# Patient Record
Sex: FEMALE | Race: BLACK OR AFRICAN AMERICAN | Employment: OTHER | URBAN - METROPOLITAN AREA
[De-identification: names, ages, dates, MRNs, and addresses within clinical notes are randomized per-mention and may not be internally consistent; named-entity substitution may affect disease eponyms.]

---

## 2020-01-19 ENCOUNTER — HOSPITAL ENCOUNTER (OUTPATIENT)
Dept: MRI IMAGING | Age: 65
Discharge: HOME | End: 2020-01-19
Attending: ORTHOPAEDIC SURGERY
Payer: MEDICARE

## 2020-01-19 DIAGNOSIS — M75.102 TEAR OF LEFT ROTATOR CUFF: ICD-10-CM

## 2020-01-19 PROCEDURE — 73221 MRI JOINT UPR EXTREM W/O DYE: CPT

## 2020-01-22 ENCOUNTER — OFFICE VISIT (OUTPATIENT)
Dept: SURGERY | Age: 65
End: 2020-01-22

## 2020-01-22 VITALS
HEART RATE: 48 BPM | SYSTOLIC BLOOD PRESSURE: 172 MMHG | WEIGHT: 293 LBS | HEIGHT: 64 IN | BODY MASS INDEX: 50.02 KG/M2 | DIASTOLIC BLOOD PRESSURE: 52 MMHG

## 2020-01-22 DIAGNOSIS — S21.002A WOUND OF LEFT BREAST, INITIAL ENCOUNTER: Primary | ICD-10-CM

## 2020-01-22 PROBLEM — E66.01 OBESITY, MORBID (HCC): Status: ACTIVE | Noted: 2020-01-22

## 2020-01-22 RX ORDER — DOXYCYCLINE 100 MG/1
CAPSULE ORAL
Status: ON HOLD | COMMUNITY
Start: 2020-01-03 | End: 2021-08-09

## 2020-01-22 RX ORDER — NALTREXONE HYDROCHLORIDE 50 MG/1
TABLET, FILM COATED ORAL
COMMUNITY
Start: 2019-12-03

## 2020-01-22 RX ORDER — KETOCONAZOLE 20 MG/G
CREAM TOPICAL
Status: ON HOLD | COMMUNITY
Start: 2019-12-10 | End: 2022-04-13

## 2020-01-22 RX ORDER — MELOXICAM 15 MG/1
TABLET ORAL
Status: ON HOLD | COMMUNITY
Start: 2020-01-12 | End: 2022-04-13

## 2020-01-22 RX ORDER — POTASSIUM CHLORIDE 20 MEQ/1
TABLET, EXTENDED RELEASE ORAL
COMMUNITY

## 2020-01-22 RX ORDER — BENAZEPRIL/HYDROCHLOROTHIAZIDE 20 MG-25MG
TABLET ORAL
Status: ON HOLD | COMMUNITY
End: 2022-04-13

## 2020-01-22 RX ORDER — BUPROPION HYDROCHLORIDE 300 MG/1
TABLET ORAL
COMMUNITY
Start: 2019-11-09

## 2020-01-22 RX ORDER — METHOCARBAMOL 500 MG/1
TABLET, FILM COATED ORAL
COMMUNITY
Start: 2020-01-12

## 2020-01-22 RX ORDER — HYDROXYZINE 25 MG/1
TABLET, FILM COATED ORAL
COMMUNITY

## 2020-01-22 RX ORDER — IPRATROPIUM BROMIDE 21 UG/1
SPRAY, METERED NASAL
Status: ON HOLD | COMMUNITY
Start: 2019-12-30 | End: 2021-08-09

## 2020-01-22 RX ORDER — ASPIRIN 325 MG
TABLET ORAL
Status: ON HOLD | COMMUNITY
End: 2022-04-13

## 2020-01-22 RX ORDER — NAPROXEN SODIUM 220 MG
220 TABLET ORAL 2 TIMES DAILY WITH MEALS
COMMUNITY

## 2020-01-22 RX ORDER — ESTRADIOL 10 UG/1
10 INSERT VAGINAL DAILY
Status: ON HOLD | COMMUNITY
End: 2021-08-09

## 2020-01-22 RX ORDER — MUPIROCIN 20 MG/G
OINTMENT TOPICAL
COMMUNITY
Start: 2020-01-17 | End: 2022-04-13

## 2020-01-22 NOTE — LETTER
1/27/2020 10:01 AM 
 
Patient:  Sheeba Dai YOB: 1955 Date of Visit: 1/22/2020 Dear Dr. Olivarez Silence: Thank you for referring Ms. Sheeba Dai to me for evaluation/treatment. Below are the relevant portions of my assessment and plan of care. HISTORY OF PRESENT ILLNESS Sheeba Dai is a 77 y.o. female. HPI 
NEW patient presents for consultation at the request of Dr. Niko Morel for skin lesion times about four days on the LEFT breast laterally. Says that she noticed some itching and then looked and noticed a red area with a scab in the middle. This has changed somewhat over the last couple of days. She has no pain and no longer has any itching. Feels no palpable breast lumps, has no nipple discharge/retraction or skin change. She has never had a breast biopsy. FH - there is no FH of breast or ovarian cancer. A maternal aunt had lung cancer and a brother had prostate cancer. Last mammogram was 6/2019 at [place], BIRADS 1, negative.   
  
 
Past Medical History:  
Diagnosis Date  Arthritis  Asthma  Hypertension  Other ill-defined conditions(799.89)   
 fibromyalgia  Other ill-defined conditions(799.89) irregular heart beat  Other ill-defined conditions(799.89)   
 edema legs  Psychiatric disorder   
 depression  Psychiatric disorder   
 anxiety  PUD (peptic ulcer disease) Past Surgical History:  
Procedure Laterality Date  BREAST SURGERY PROCEDURE UNLISTED    
 sebaceous cyst removal  
 HX GYN    
 d&c  
 HX HEENT    
 keloids removed from ears  HX HEENT    
 tonsillectomy  HX ORTHOPAEDIC    
 right ankle Social History Socioeconomic History  Marital status:  Spouse name: Not on file  Number of children: Not on file  Years of education: Not on file  Highest education level: Not on file Occupational History  Not on file Social Needs  Financial resource strain: Not on file  Food insecurity:  
  Worry: Not on file Inability: Not on file  Transportation needs:  
  Medical: Not on file Non-medical: Not on file Tobacco Use  Smoking status: Never Smoker  Smokeless tobacco: Never Used Substance and Sexual Activity  Alcohol use: No  
 Drug use: Not on file  Sexual activity: Not on file Lifestyle  Physical activity:  
  Days per week: Not on file Minutes per session: Not on file  Stress: Not on file Relationships  Social connections:  
  Talks on phone: Not on file Gets together: Not on file Attends [employer] service: Not on file Active member of club or organization: Not on file Attends meetings of clubs or organizations: Not on file Relationship status: Not on file  Intimate partner violence:  
  Fear of current or ex partner: Not on file Emotionally abused: Not on file Physically abused: Not on file Forced sexual activity: Not on file Other Topics Concern  Not on file Social History Narrative  Not on file Current Outpatient Medications on File Prior to Visit Medication Sig Dispense Refill  buPROPion XL (WELLBUTRIN XL) 300 mg XL tablet  benazepril-hydroCHLOROthiazide (LOTENSIN HCT) 20-25 mg per tablet 1 tab(s)  doxycycline (VIBRAMYCIN) 100 mg capsule  hydroxyzine HCL (ATARAX) 25 mg tablet 1-4 tabs  ipratropium (ATROVENT) 0.03 % nasal spray  ketoconazole (NIZORAL) 2 % topical cream     
 meloxicam (MOBIC) 15 mg tablet  methocarbamol (ROBAXIN) 500 mg tablet TAKE 2 TABLET BY MOUTH 3 TIMES A DAY AS NEEDED    
 mupirocin (BACTROBAN) 2 % ointment  naltrexone (DEPADE) 50 mg tablet TAKE 1 TABLET BY MOUTH EVERY DAY  potassium chloride (K-DUR, KLOR-CON) 20 mEq tablet 1 tab(s)  aspirin (ASPIRIN) 325 mg tablet 1 tab(s)  estradioL (YUVAFEM) 10 mcg tab vaginal tablet Insert 10 mcg into vagina daily.  naproxen sodium (ALEVE) 220 mg tablet Take 220 mg by mouth two (2) times daily (with meals).  epinephrine (EPIPEN INJECTION) by Injection route.  OTHER Hylands leg cramps OTC  esomeprazole (NEXIUM) 20 mg capsule Take 40 mg by mouth two (2) times a day.  carvedilol (COREG) 12.5 mg tablet Take 25 mg by mouth two (2) times daily (with meals).  [DISCONTINUED] cyclobenzaprine (FLEXERIL) 10 mg tablet Take 1 Tab by mouth three (3) times daily as needed for Muscle Spasm(s). 20 Tab 0  [DISCONTINUED] nabumetone (RELAFEN) 500 mg tablet Take 500 mg by mouth two (2) times a day.  [DISCONTINUED] aspirin 81 mg chewable tablet Take 81 mg by mouth daily.  [DISCONTINUED] clonazePAM (KLONOPIN) 0.5 mg tablet Take  by mouth nightly as needed.  [DISCONTINUED] lamoTRIgine (LAMICTAL) 25 mg tablet Take 100 mg by mouth daily.  [DISCONTINUED] olmesartan-hydrochlorothiazide (BENICAR HCT) 20-12.5 mg per tablet Take 1 Tab by mouth daily.  [DISCONTINUED] hydrOXYzine (VISTARIL) 100 mg capsule Take 100 mg by mouth three (3) times daily as needed for Itching.  [DISCONTINUED] atorvastatin (LIPITOR) 20 mg tablet Take  by mouth daily.  [DISCONTINUED] NEPAFENAC (NEVANAC OP) Apply  to eye two (2) times a day. Both eyes  [DISCONTINUED] traMADol (ULTRAM) 50 mg tablet Take 50 mg by mouth every six (6) hours as needed for Pain. No current facility-administered medications on file prior to visit. Allergies Allergen Reactions  Augmentin [Amoxicillin-Pot Clavulanate] Anaphylaxis  Sulfa (Sulfonamide Antibiotics) Anaphylaxis  Venom-Honey Bee Anaphylaxis  Avelox [Moxifloxacin] Unknown (comments)  Blueberry Rash  Celebrex [Celecoxib] Rash  Codeine Rash  Ibuprofen Rash  Lodine [Etodolac] Unknown (comments)  Nsaids (Non-Steroidal Anti-Inflammatory Drug) Rash Some nsaids are tolerated  Shellfish Derived Hives  Soma [Carisoprodol] Unknown (comments)  Tequin [Gatifloxacin] Unknown (comments) OB History No obstetric history on file. Obstetric Comments Menarche 13, LMP 10-15 years ago, # of children 1, age of 4st delivery 32, Hysterectomy/oophorectomy NoNo, Breast bx No, history of breast feeding Yes, BCP Yes, in the past, Hormone therapy No 
  
  
  
 
 
ROS Constitutional: Positive for weight loss. HENT: Positive for congestion. Eyes: Positive for pain. Respiratory: Negative. Cardiovascular: Positive for chest pain, palpitations and leg swelling. Gastrointestinal: Positive for abdominal pain, constipation and heartburn. Genitourinary: Negative. Musculoskeletal: Positive for joint pain and myalgias. Skin: Positive for itching and rash. Neurological: Positive for dizziness. Endo/Heme/Allergies: Negative. Psychiatric/Behavioral: Positive for depression. The patient is nervous/anxious. Physical Exam 
Exam conducted with a chaperone present. Cardiovascular:  
   Rate and Rhythm: Normal rate and regular rhythm. Heart sounds: Normal heart sounds. Pulmonary:  
   Breath sounds: Normal breath sounds. Chest:  
   Breasts: Breasts are symmetrical.  
      Right: Normal. No swelling, bleeding, inverted nipple, mass, nipple discharge, skin change or tenderness. Left: Normal. No swelling, bleeding, inverted nipple, mass, nipple discharge, skin change or tenderness. Lymphadenopathy:  
   Cervical:  
   Right cervical: No superficial, deep or posterior cervical adenopathy. Left cervical: No superficial, deep or posterior cervical adenopathy. Upper Body:  
   Right upper body: No supraclavicular or axillary adenopathy. Left upper body: No supraclavicular or axillary adenopathy. ASSESSMENT and PLAN 
  ICD-10-CM ICD-9-CM 1. Lesion of skin of breast N64.9 611.9 2. Wound of left breast, initial encounter S21.002A 879.0 New patient presents for evaluation of LEFT breast skin lesion, and is doing well overall. Healing, superficial granulating wound noted at lateral LEFT breast. Probable spider or insect bite. Pt may continue to apply topical antibiotic. If not completely healed in 1 month, pt to follow up for bx. F/U PRN. This plan was reviewed with the patient and patient agrees. All questions were answered. Written by Roz Pagan, as dictated by Dr. Jason Pressley MD. 
 
 
If you have questions, please do not hesitate to call me. I look forward to following Ms. Kelsey Rose along with you.  
 
 
 
Sincerely, 
 
 
Shawanda Mane MD

## 2020-01-22 NOTE — PROGRESS NOTES
HISTORY OF PRESENT ILLNESS  Geneva Moyer is a 77 y.o. female. HPI  NEW patient presents for consultation at the request of Dr. Ashlee Olea for skin lesion times about four days on the LEFT breast laterally. Says that she noticed some itching and then looked and noticed a red area with a scab in the middle. This has changed somewhat over the last couple of days. She has no pain and no longer has any itching. Feels no palpable breast lumps, has no nipple discharge/retraction or skin change. She has never had a breast biopsy. FH - there is no FH of breast or ovarian cancer. A maternal aunt had lung cancer and a brother had prostate cancer.      Last mammogram was 6/2019 at [facility], BIRADS 1, negative.         Past Medical History:   Diagnosis Date    Arthritis     Asthma     Hypertension     Other ill-defined conditions(799.89)     fibromyalgia    Other ill-defined conditions(799.89)     irregular heart beat    Other ill-defined conditions(799.89)     edema legs    Psychiatric disorder     depression    Psychiatric disorder     anxiety    PUD (peptic ulcer disease)        Past Surgical History:   Procedure Laterality Date    BREAST SURGERY PROCEDURE UNLISTED      sebaceous cyst removal    HX GYN      d&c    HX HEENT      keloids removed from ears    HX HEENT      tonsillectomy    HX ORTHOPAEDIC      right ankle       Social History     Socioeconomic History    Marital status:      Spouse name: Not on file    Number of children: Not on file    Years of education: Not on file    Highest education level: Not on file   Occupational History    Not on file   Social Needs    Financial resource strain: Not on file    Food insecurity:     Worry: Not on file     Inability: Not on file    Transportation needs:     Medical: Not on file     Non-medical: Not on file   Tobacco Use    Smoking status: Never Smoker    Smokeless tobacco: Never Used   Substance and Sexual Activity    Alcohol use: No    Drug use: Not on file    Sexual activity: Not on file   Lifestyle    Physical activity:     Days per week: Not on file     Minutes per session: Not on file    Stress: Not on file   Relationships    Social connections:     Talks on phone: Not on file     Gets together: Not on file     Attends [employer] service: Not on file     Active member of club or organization: Not on file     Attends meetings of clubs or organizations: Not on file     Relationship status: Not on file    Intimate partner violence:     Fear of current or ex partner: Not on file     Emotionally abused: Not on file     Physically abused: Not on file     Forced sexual activity: Not on file   Other Topics Concern    Not on file   Social History Narrative    Not on file       Current Outpatient Medications on File Prior to Visit   Medication Sig Dispense Refill    buPROPion XL (WELLBUTRIN XL) 300 mg XL tablet       benazepril-hydroCHLOROthiazide (LOTENSIN HCT) 20-25 mg per tablet 1 tab(s)      doxycycline (VIBRAMYCIN) 100 mg capsule       hydroxyzine HCL (ATARAX) 25 mg tablet 1-4 tabs      ipratropium (ATROVENT) 0.03 % nasal spray       ketoconazole (NIZORAL) 2 % topical cream       meloxicam (MOBIC) 15 mg tablet       methocarbamol (ROBAXIN) 500 mg tablet TAKE 2 TABLET BY MOUTH 3 TIMES A DAY AS NEEDED      mupirocin (BACTROBAN) 2 % ointment       naltrexone (DEPADE) 50 mg tablet TAKE 1 TABLET BY MOUTH EVERY DAY      potassium chloride (K-DUR, KLOR-CON) 20 mEq tablet 1 tab(s)      aspirin (ASPIRIN) 325 mg tablet 1 tab(s)      estradioL (YUVAFEM) 10 mcg tab vaginal tablet Insert 10 mcg into vagina daily.  naproxen sodium (ALEVE) 220 mg tablet Take 220 mg by mouth two (2) times daily (with meals).  epinephrine (EPIPEN INJECTION) by Injection route.  OTHER Hylands leg cramps OTC      esomeprazole (NEXIUM) 20 mg capsule Take 40 mg by mouth two (2) times a day.       carvedilol (COREG) 12.5 mg tablet Take 25 mg by mouth two (2) times daily (with meals).  [DISCONTINUED] cyclobenzaprine (FLEXERIL) 10 mg tablet Take 1 Tab by mouth three (3) times daily as needed for Muscle Spasm(s). 20 Tab 0    [DISCONTINUED] nabumetone (RELAFEN) 500 mg tablet Take 500 mg by mouth two (2) times a day.  [DISCONTINUED] aspirin 81 mg chewable tablet Take 81 mg by mouth daily.  [DISCONTINUED] clonazePAM (KLONOPIN) 0.5 mg tablet Take  by mouth nightly as needed.  [DISCONTINUED] lamoTRIgine (LAMICTAL) 25 mg tablet Take 100 mg by mouth daily.  [DISCONTINUED] olmesartan-hydrochlorothiazide (BENICAR HCT) 20-12.5 mg per tablet Take 1 Tab by mouth daily.  [DISCONTINUED] hydrOXYzine (VISTARIL) 100 mg capsule Take 100 mg by mouth three (3) times daily as needed for Itching.  [DISCONTINUED] atorvastatin (LIPITOR) 20 mg tablet Take  by mouth daily.  [DISCONTINUED] NEPAFENAC (NEVANAC OP) Apply  to eye two (2) times a day. Both eyes      [DISCONTINUED] traMADol (ULTRAM) 50 mg tablet Take 50 mg by mouth every six (6) hours as needed for Pain. No current facility-administered medications on file prior to visit. Allergies   Allergen Reactions    Augmentin [Amoxicillin-Pot Clavulanate] Anaphylaxis    Sulfa (Sulfonamide Antibiotics) Anaphylaxis    Venom-Honey Bee Anaphylaxis    Avelox [Moxifloxacin] Unknown (comments)    Blueberry Rash    Celebrex [Celecoxib] Rash    Codeine Rash    Ibuprofen Rash    Lodine [Etodolac] Unknown (comments)    Nsaids (Non-Steroidal Anti-Inflammatory Drug) Rash     Some nsaids are tolerated    Shellfish Derived Hives    Soma [Carisoprodol] Unknown (comments)    Tequin [Gatifloxacin] Unknown (comments)       OB History    No obstetric history on file.       Obstetric Comments   Menarche 13, LMP 10-15 years ago, # of children 1, age of 2st delivery 32, Hysterectomy/oophorectomy NoNo, Breast bx No, history of breast feeding Yes, BCP Yes, in the past, Hormone therapy No               ROS  Constitutional: Positive for weight loss. HENT: Positive for congestion. Eyes: Positive for pain. Respiratory: Negative. Cardiovascular: Positive for chest pain, palpitations and leg swelling. Gastrointestinal: Positive for abdominal pain, constipation and heartburn. Genitourinary: Negative. Musculoskeletal: Positive for joint pain and myalgias. Skin: Positive for itching and rash. Neurological: Positive for dizziness. Endo/Heme/Allergies: Negative. Psychiatric/Behavioral: Positive for depression. The patient is nervous/anxious. Physical Exam  Exam conducted with a chaperone present. Cardiovascular:      Rate and Rhythm: Normal rate and regular rhythm. Heart sounds: Normal heart sounds. Pulmonary:      Breath sounds: Normal breath sounds. Chest:      Breasts: Breasts are symmetrical.         Right: Normal. No swelling, bleeding, inverted nipple, mass, nipple discharge, skin change or tenderness. Left: Normal. No swelling, bleeding, inverted nipple, mass, nipple discharge, skin change or tenderness. Lymphadenopathy:      Cervical:      Right cervical: No superficial, deep or posterior cervical adenopathy. Left cervical: No superficial, deep or posterior cervical adenopathy. Upper Body:      Right upper body: No supraclavicular or axillary adenopathy. Left upper body: No supraclavicular or axillary adenopathy. ASSESSMENT and PLAN    ICD-10-CM ICD-9-CM    1. Lesion of skin of breast N64.9 611.9    2. Wound of left breast, initial encounter S21.002A 879.0       New patient presents for evaluation of LEFT breast skin lesion, and is doing well overall. Healing, superficial granulating wound noted at lateral LEFT breast. Probable spider or insect bite. Pt may continue to apply topical antibiotic. If not completely healed in 1 month, pt to follow up for bx. F/U PRN.  This plan was reviewed with the patient and patient agrees. All questions were answered.     Written by Maury Mills, as dictated by Dr. Perez Caldwell MD.

## 2020-01-22 NOTE — PROGRESS NOTES
HISTORY OF PRESENT ILLNESS Guerline Epps is a 77 y.o. female. HPI    NEW patient presents for consultation at the request of Dr. Dwaine Naylor for skin lesion times about four days on the LEFT breast laterally. Says that she noticed some itching and then looked and noticed a red area with a scab in the middle. This has changed somewhat over the last couple of days. She has no pain and no longer has any itching. Feels no palpable breast lumps, has no nipple discharge/retraction or skin change. She has never had a breast biopsy. FH - there is no FH of breast or ovarian cancer. A maternal aunt had lung cancer and a brother had prostate cancer. Last mammogram was 6/2019 at [facility], BIRADS 1, negative. Review of Systems Constitutional: Positive for weight loss. HENT: Positive for congestion. Eyes: Positive for pain. Respiratory: Negative. Cardiovascular: Positive for chest pain, palpitations and leg swelling. Gastrointestinal: Positive for abdominal pain, constipation and heartburn. Genitourinary: Negative. Musculoskeletal: Positive for joint pain and myalgias. Skin: Positive for itching and rash. Neurological: Positive for dizziness. Endo/Heme/Allergies: Negative. Psychiatric/Behavioral: Positive for depression. The patient is nervous/anxious. Physical Exam 
 
ASSESSMENT and PLAN 
{ASSESSMENT/PLAN:10212}

## 2020-01-27 NOTE — COMMUNICATION BODY
HISTORY OF PRESENT ILLNESS Courtney Novak is a 77 y.o. female. HPI 
NEW patient presents for consultation at the request of Dr. Molina Brothpavan for skin lesion times about four days on the LEFT breast laterally. Says that she noticed some itching and then looked and noticed a red area with a scab in the middle. This has changed somewhat over the last couple of days. She has no pain and no longer has any itching. Feels no palpable breast lumps, has no nipple discharge/retraction or skin change. She has never had a breast biopsy. FH - there is no FH of breast or ovarian cancer. A maternal aunt had lung cancer and a brother had prostate cancer. Last mammogram was 6/2019 at [place], [facility], negative.   
  
 
Past Medical History:  
Diagnosis Date  Arthritis  Asthma  Hypertension  Other ill-defined conditions(799.89)   
 fibromyalgia  Other ill-defined conditions(799.89) irregular heart beat  Other ill-defined conditions(799.89)   
 edema legs  Psychiatric disorder   
 depression  Psychiatric disorder   
 anxiety  PUD (peptic ulcer disease) Past Surgical History:  
Procedure Laterality Date  BREAST SURGERY PROCEDURE UNLISTED    
 sebaceous cyst removal  
 HX GYN    
 d&c  
 HX HEENT    
 keloids removed from ears  HX HEENT    
 tonsillectomy  HX ORTHOPAEDIC    
 right ankle Social History Socioeconomic History  Marital status:  Spouse name: Not on file  Number of children: Not on file  Years of education: Not on file  Highest education level: Not on file Occupational History  Not on file Social Needs  Financial resource strain: Not on file  Food insecurity:  
  Worry: Not on file Inability: Not on file  Transportation needs:  
  Medical: Not on file Non-medical: Not on file Tobacco Use  Smoking status: Never Smoker  Smokeless tobacco: Never Used Substance and Sexual Activity  Alcohol use: No  
 Drug use: Not on file  Sexual activity: Not on file Lifestyle  Physical activity:  
  Days per week: Not on file Minutes per session: Not on file  Stress: Not on file Relationships  Social connections:  
  Talks on phone: Not on file Gets together: Not on file Attends [employer] service: Not on file Active member of club or organization: Not on file Attends meetings of clubs or organizations: Not on file Relationship status: Not on file  Intimate partner violence:  
  Fear of current or ex partner: Not on file Emotionally abused: Not on file Physically abused: Not on file Forced sexual activity: Not on file Other Topics Concern  Not on file Social History Narrative  Not on file Current Outpatient Medications on File Prior to Visit Medication Sig Dispense Refill  buPROPion XL (WELLBUTRIN XL) 300 mg XL tablet  benazepril-hydroCHLOROthiazide (LOTENSIN HCT) 20-25 mg per tablet 1 tab(s)  doxycycline (VIBRAMYCIN) 100 mg capsule  hydroxyzine HCL (ATARAX) 25 mg tablet 1-4 tabs  ipratropium (ATROVENT) 0.03 % nasal spray  ketoconazole (NIZORAL) 2 % topical cream     
 meloxicam (MOBIC) 15 mg tablet  methocarbamol (ROBAXIN) 500 mg tablet TAKE 2 TABLET BY MOUTH 3 TIMES A DAY AS NEEDED    
 mupirocin (BACTROBAN) 2 % ointment  naltrexone (DEPADE) 50 mg tablet TAKE 1 TABLET BY MOUTH EVERY DAY  potassium chloride (K-DUR, KLOR-CON) 20 mEq tablet 1 tab(s)  aspirin (ASPIRIN) 325 mg tablet 1 tab(s)  estradioL (YUVAFEM) 10 mcg tab vaginal tablet Insert 10 mcg into vagina daily.  naproxen sodium (ALEVE) 220 mg tablet Take 220 mg by mouth two (2) times daily (with meals).  epinephrine (EPIPEN INJECTION) by Injection route.  OTHER Hylands leg cramps OTC  esomeprazole (NEXIUM) 20 mg capsule Take 40 mg by mouth two (2) times a day.  carvedilol (COREG) 12.5 mg tablet Take 25 mg by mouth two (2) times daily (with meals).  [DISCONTINUED] cyclobenzaprine (FLEXERIL) 10 mg tablet Take 1 Tab by mouth three (3) times daily as needed for Muscle Spasm(s). 20 Tab 0  [DISCONTINUED] nabumetone (RELAFEN) 500 mg tablet Take 500 mg by mouth two (2) times a day.  [DISCONTINUED] aspirin 81 mg chewable tablet Take 81 mg by mouth daily.  [DISCONTINUED] clonazePAM (KLONOPIN) 0.5 mg tablet Take  by mouth nightly as needed.  [DISCONTINUED] lamoTRIgine (LAMICTAL) 25 mg tablet Take 100 mg by mouth daily.  [DISCONTINUED] olmesartan-hydrochlorothiazide (BENICAR HCT) 20-12.5 mg per tablet Take 1 Tab by mouth daily.  [DISCONTINUED] hydrOXYzine (VISTARIL) 100 mg capsule Take 100 mg by mouth three (3) times daily as needed for Itching.  [DISCONTINUED] atorvastatin (LIPITOR) 20 mg tablet Take  by mouth daily.  [DISCONTINUED] NEPAFENAC (NEVANAC OP) Apply  to eye two (2) times a day. Both eyes  [DISCONTINUED] traMADol (ULTRAM) 50 mg tablet Take 50 mg by mouth every six (6) hours as needed for Pain. No current facility-administered medications on file prior to visit. Allergies Allergen Reactions  Augmentin [Amoxicillin-Pot Clavulanate] Anaphylaxis  Sulfa (Sulfonamide Antibiotics) Anaphylaxis  Venom-Honey Bee Anaphylaxis  Avelox [Moxifloxacin] Unknown (comments)  Blueberry Rash  Celebrex [Celecoxib] Rash  Codeine Rash  Ibuprofen Rash  Lodine [Etodolac] Unknown (comments)  Nsaids (Non-Steroidal Anti-Inflammatory Drug) Rash Some nsaids are tolerated  Shellfish Derived Hives  Soma [Carisoprodol] Unknown (comments)  Tequin [Gatifloxacin] Unknown (comments) OB History No obstetric history on file. Obstetric Comments Menarche 13, LMP 10-15 years ago, # of children 1, age of 4st delivery 32, Hysterectomy/oophorectomy NoNo, Breast bx No, history of breast feeding Yes, BCP Yes, in the past, Hormone therapy No 
  
  
  
 
 
ROS Constitutional: Positive for weight loss. HENT: Positive for congestion. Eyes: Positive for pain. Respiratory: Negative. Cardiovascular: Positive for chest pain, palpitations and leg swelling. Gastrointestinal: Positive for abdominal pain, constipation and heartburn. Genitourinary: Negative. Musculoskeletal: Positive for joint pain and myalgias. Skin: Positive for itching and rash. Neurological: Positive for dizziness. Endo/Heme/Allergies: Negative. Psychiatric/Behavioral: Positive for depression. The patient is nervous/anxious. Physical Exam 
Exam conducted with a chaperone present. Cardiovascular:  
   Rate and Rhythm: Normal rate and regular rhythm. Heart sounds: Normal heart sounds. Pulmonary:  
   Breath sounds: Normal breath sounds. Chest:  
   Breasts: Breasts are symmetrical.  
      Right: Normal. No swelling, bleeding, inverted nipple, mass, nipple discharge, skin change or tenderness. Left: Normal. No swelling, bleeding, inverted nipple, mass, nipple discharge, skin change or tenderness. Lymphadenopathy:  
   Cervical:  
   Right cervical: No superficial, deep or posterior cervical adenopathy. Left cervical: No superficial, deep or posterior cervical adenopathy. Upper Body:  
   Right upper body: No supraclavicular or axillary adenopathy. Left upper body: No supraclavicular or axillary adenopathy. ASSESSMENT and PLAN 
  ICD-10-CM ICD-9-CM 1. Lesion of skin of breast N64.9 611.9 2. Wound of left breast, initial encounter S21.002A 879.0 New patient presents for evaluation of LEFT breast skin lesion, and is doing well overall. Healing, superficial granulating wound noted at lateral LEFT breast. Probable spider or insect bite. Pt may continue to apply topical antibiotic.  If not completely healed in 1 month, pt to follow up for bx. F/U PRN. This plan was reviewed with the patient and patient agrees. All questions were answered.  
 
Written by Mark Camacho, as dictated by Dr. Seamus Soares MD.

## 2020-02-06 ENCOUNTER — DOCUMENTATION ONLY (OUTPATIENT)
Dept: SURGERY | Age: 65
End: 2020-02-06

## 2020-02-06 NOTE — PROGRESS NOTES
Progress note was faxed to Dr. [email or web address] and also mailed to patient per patient.

## 2021-08-09 ENCOUNTER — ANESTHESIA EVENT (OUTPATIENT)
Dept: ENDOSCOPY | Age: 66
End: 2021-08-09
Payer: MEDICARE

## 2021-08-09 ENCOUNTER — ANESTHESIA (OUTPATIENT)
Dept: ENDOSCOPY | Age: 66
End: 2021-08-09
Payer: MEDICARE

## 2021-08-09 ENCOUNTER — HOSPITAL ENCOUNTER (OUTPATIENT)
Age: 66
Setting detail: OUTPATIENT SURGERY
Discharge: HOME | End: 2021-08-09
Attending: INTERNAL MEDICINE | Admitting: INTERNAL MEDICINE
Payer: MEDICARE

## 2021-08-09 VITALS
RESPIRATION RATE: 12 BRPM | BODY MASS INDEX: 50.02 KG/M2 | TEMPERATURE: 96.9 F | SYSTOLIC BLOOD PRESSURE: 149 MMHG | DIASTOLIC BLOOD PRESSURE: 56 MMHG | OXYGEN SATURATION: 99 % | HEIGHT: 64 IN | HEART RATE: 61 BPM | WEIGHT: 293 LBS

## 2021-08-09 PROCEDURE — 2709999900 HC NON-CHARGEABLE SUPPLY: Performed by: INTERNAL MEDICINE

## 2021-08-09 PROCEDURE — 76040000019: Performed by: INTERNAL MEDICINE

## 2021-08-09 PROCEDURE — 74011000250 HC RX REV CODE- 250: Performed by: NURSE ANESTHETIST, CERTIFIED REGISTERED

## 2021-08-09 PROCEDURE — 74011250636 HC RX REV CODE- 250/636: Performed by: NURSE ANESTHETIST, CERTIFIED REGISTERED

## 2021-08-09 PROCEDURE — 76060000031 HC ANESTHESIA FIRST 0.5 HR: Performed by: INTERNAL MEDICINE

## 2021-08-09 RX ORDER — FLUMAZENIL 0.1 MG/ML
0.2 INJECTION INTRAVENOUS
Status: DISCONTINUED | OUTPATIENT
Start: 2021-08-09 | End: 2021-08-09 | Stop reason: HOSPADM

## 2021-08-09 RX ORDER — SODIUM CHLORIDE 0.9 % (FLUSH) 0.9 %
5-40 SYRINGE (ML) INJECTION EVERY 8 HOURS
Status: DISCONTINUED | OUTPATIENT
Start: 2021-08-09 | End: 2021-08-09 | Stop reason: HOSPADM

## 2021-08-09 RX ORDER — AMLODIPINE BESYLATE 10 MG/1
TABLET ORAL DAILY
Status: ON HOLD | COMMUNITY
End: 2022-04-13

## 2021-08-09 RX ORDER — METRONIDAZOLE 500 MG/1
500 TABLET ORAL 2 TIMES DAILY
COMMUNITY

## 2021-08-09 RX ORDER — DEXTROMETHORPHAN/PSEUDOEPHED 2.5-7.5/.8
1.2 DROPS ORAL
Status: DISCONTINUED | OUTPATIENT
Start: 2021-08-09 | End: 2021-08-09 | Stop reason: HOSPADM

## 2021-08-09 RX ORDER — NALOXONE HYDROCHLORIDE 0.4 MG/ML
0.4 INJECTION, SOLUTION INTRAMUSCULAR; INTRAVENOUS; SUBCUTANEOUS
Status: DISCONTINUED | OUTPATIENT
Start: 2021-08-09 | End: 2021-08-09 | Stop reason: HOSPADM

## 2021-08-09 RX ORDER — MIDAZOLAM HYDROCHLORIDE 1 MG/ML
.25-5 INJECTION, SOLUTION INTRAMUSCULAR; INTRAVENOUS
Status: DISCONTINUED | OUTPATIENT
Start: 2021-08-09 | End: 2021-08-09 | Stop reason: HOSPADM

## 2021-08-09 RX ORDER — ATROPINE SULFATE 0.1 MG/ML
0.5 INJECTION INTRAVENOUS
Status: DISCONTINUED | OUTPATIENT
Start: 2021-08-09 | End: 2021-08-09 | Stop reason: HOSPADM

## 2021-08-09 RX ORDER — CARBOXYMETHYLCELLULOSE SODIUM 10 MG/ML
1 GEL OPHTHALMIC AS NEEDED
COMMUNITY
End: 2022-04-13

## 2021-08-09 RX ORDER — SODIUM CHLORIDE 9 MG/ML
50 INJECTION, SOLUTION INTRAVENOUS CONTINUOUS
Status: DISCONTINUED | OUTPATIENT
Start: 2021-08-09 | End: 2021-08-09 | Stop reason: HOSPADM

## 2021-08-09 RX ORDER — LIDOCAINE HYDROCHLORIDE 20 MG/ML
INJECTION, SOLUTION EPIDURAL; INFILTRATION; INTRACAUDAL; PERINEURAL AS NEEDED
Status: DISCONTINUED | OUTPATIENT
Start: 2021-08-09 | End: 2021-08-09 | Stop reason: HOSPADM

## 2021-08-09 RX ORDER — FENTANYL CITRATE 50 UG/ML
25-200 INJECTION, SOLUTION INTRAMUSCULAR; INTRAVENOUS
Status: DISCONTINUED | OUTPATIENT
Start: 2021-08-09 | End: 2021-08-09 | Stop reason: HOSPADM

## 2021-08-09 RX ORDER — FLUTICASONE PROPIONATE AND SALMETEROL 100; 50 UG/1; UG/1
1 POWDER RESPIRATORY (INHALATION) EVERY 12 HOURS
COMMUNITY

## 2021-08-09 RX ORDER — EPINEPHRINE 0.1 MG/ML
1 INJECTION INTRACARDIAC; INTRAVENOUS
Status: DISCONTINUED | OUTPATIENT
Start: 2021-08-09 | End: 2021-08-09 | Stop reason: HOSPADM

## 2021-08-09 RX ORDER — ASCORBIC ACID 250 MG
TABLET ORAL DAILY
COMMUNITY

## 2021-08-09 RX ORDER — GLUCOSAMINE SULFATE 1500 MG
POWDER IN PACKET (EA) ORAL DAILY
COMMUNITY

## 2021-08-09 RX ORDER — OMEGA-3-ACID ETHYL ESTERS 1 G/1
CAPSULE, LIQUID FILLED ORAL
COMMUNITY

## 2021-08-09 RX ORDER — SODIUM CHLORIDE 9 MG/ML
INJECTION, SOLUTION INTRAVENOUS
Status: DISCONTINUED | OUTPATIENT
Start: 2021-08-09 | End: 2021-08-09 | Stop reason: HOSPADM

## 2021-08-09 RX ORDER — OLOPATADINE HYDROCHLORIDE 2 MG/ML
1 SOLUTION/ DROPS OPHTHALMIC DAILY
COMMUNITY

## 2021-08-09 RX ORDER — SPIRONOLACTONE 25 MG/1
TABLET ORAL DAILY
COMMUNITY

## 2021-08-09 RX ORDER — SODIUM CHLORIDE 0.9 % (FLUSH) 0.9 %
5-40 SYRINGE (ML) INJECTION AS NEEDED
Status: DISCONTINUED | OUTPATIENT
Start: 2021-08-09 | End: 2021-08-09 | Stop reason: HOSPADM

## 2021-08-09 RX ORDER — PROPOFOL 10 MG/ML
INJECTION, EMULSION INTRAVENOUS AS NEEDED
Status: DISCONTINUED | OUTPATIENT
Start: 2021-08-09 | End: 2021-08-09 | Stop reason: HOSPADM

## 2021-08-09 RX ADMIN — PROPOFOL 100 MG: 10 INJECTION, EMULSION INTRAVENOUS at 11:32

## 2021-08-09 RX ADMIN — PROPOFOL 30 MG: 10 INJECTION, EMULSION INTRAVENOUS at 11:38

## 2021-08-09 RX ADMIN — PROPOFOL 30 MG: 10 INJECTION, EMULSION INTRAVENOUS at 11:35

## 2021-08-09 RX ADMIN — SODIUM CHLORIDE: 900 INJECTION, SOLUTION INTRAVENOUS at 11:41

## 2021-08-09 RX ADMIN — LIDOCAINE HYDROCHLORIDE 100 MG: 20 INJECTION, SOLUTION EPIDURAL; INFILTRATION; INTRACAUDAL; PERINEURAL at 11:33

## 2021-08-09 RX ADMIN — LIDOCAINE HYDROCHLORIDE 30 MG: 20 INJECTION, SOLUTION EPIDURAL; INFILTRATION; INTRACAUDAL; PERINEURAL at 11:22

## 2021-08-09 RX ADMIN — PROPOFOL 100 MG: 10 INJECTION, EMULSION INTRAVENOUS at 11:22

## 2021-08-09 RX ADMIN — SODIUM CHLORIDE: 900 INJECTION, SOLUTION INTRAVENOUS at 11:07

## 2021-08-09 NOTE — PROGRESS NOTES

## 2021-08-09 NOTE — DISCHARGE INSTRUCTIONS
[address], [address] [place] [address]    COLON DISCHARGE INSTRUCTIONS    Jerzy Middleton  [id number]  1955    Discomfort:  Redness at IV site- apply warm compress to area; if redness or soreness persist- contact your physician  There may be a slight amount of blood passed from the rectum  Gaseous discomfort- walking, belching will help relieve any discomfort  You may not operate a vehicle for 12 hours  You may not engage in an occupation involving machinery or appliances for rest of today  You may not drink alcoholic beverages for at least 12 hours  Avoid making any critical decisions for at least 24 hour  DIET:  You may resume your regular diet - however -  remember your colon is empty and a heavy meal will produce gas. Avoid these foods:  vegetables, fried / greasy foods, carbonated drinks     ACTIVITY:  You may  resume your normal daily activities it is recommended that you spend the remainder of the day resting -  avoid any strenuous activity. CALL M.D. ANY SIGN OF:   Increasing pain, nausea, vomiting  Abdominal distension (swelling)  New increased bleeding (oral or rectal)  Fever (chills)  Pain in chest area  Bloody discharge from nose or mouth  Shortness of breath      Follow-up Instructions:   Call Dr. Leatha Shields for any questions or problems at [phone]  High fiber diet          ENDOSCOPY FINDINGS:   Your colonoscopy showed mild diverticulosis, rest of exam was normal.  Telephone # [phone]      Signed By: Leatha Shields MD     8/9/2021  11:47 AM       DISCHARGE SUMMARY from Nurse    The following personal items collected during your admission are returned to you:   Dental Appliance: Dental Appliances: None  Vision: Visual Aid: At bedside  Hearing Aid:    Jewelry:    Clothing:    Other Valuables:    Valuables sent to safe:        Learning About Coronavirus (COVID-19)  Coronavirus (COVID-19): Overview  What is coronavirus (COVID-19)?   The coronavirus disease (COVID-19) is caused by a virus. It is an illness that was [facility]. It has since spread worldwide. The virus can cause fever, cough, and trouble breathing. In severe cases, it can cause pneumonia and make it hard to breathe without help. It can cause death. Coronaviruses are a large group of viruses. They cause the common cold. They also cause more serious illnesses like Middle East respiratory syndrome (MERS) and severe acute respiratory syndrome (SARS). COVID-19 is caused by a novel coronavirus. That means it's a new type that has not been seen in people before. This virus spreads person-to-person through droplets from coughing and sneezing. It can also spread when you are close to someone who is infected. And it can spread when you touch something that has the virus on it, such as a doorknob or a tabletop. What can you do to protect yourself from coronavirus (COVID-19)? The best way to protect yourself from getting sick is to:  · Avoid areas where there is an outbreak. · Avoid contact with people who may be infected. · Wash your hands often with soap or alcohol-based hand sanitizers. · Avoid crowds and try to stay at least 6 feet away from other people. · Wash your hands often, especially after you cough or sneeze. Use soap and water, and scrub for at least 20 seconds. If soap and water aren't available, use an alcohol-based hand . · Avoid touching your mouth, nose, and eyes. What can you do to avoid spreading the virus to others? To help avoid spreading the virus to others:  · Cover your mouth with a tissue when you cough or sneeze. Then throw the tissue in the trash. · Use a disinfectant to clean things that you touch often. · Stay home if you are sick or have been exposed to the virus. Don't go to school, work, or public areas. And don't use public transportation.   · If you are sick:  ? Leave your home only if you need to get medical care. But call the doctor's office first so they know you're coming. And wear a face mask, if you have one.  ? If you have a face mask, wear it whenever you're around other people. It can help stop the spread of the virus when you cough or sneeze. ? Clean and disinfect your home every day. Use household  and disinfectant wipes or sprays. Take special care to clean things that you grab with your hands. These include doorknobs, remote controls, phones, and handles on your refrigerator and microwave. And don't forget countertops, tabletops, bathrooms, and computer keyboards. When to call for help  Call 911 anytime you think you may need emergency care. For example, call if:  · You have severe trouble breathing. (You can't talk at all.)  · You have constant chest pain or pressure. · You are severely dizzy or lightheaded. · You are confused or can't think clearly. · Your face and lips have a blue color. · You pass out (lose consciousness) or are very hard to wake up. Call your doctor now if you develop symptoms such as:  · Shortness of breath. · Fever. · Cough. If you need to get care, call ahead to the doctor's office for instructions before you go. Make sure you wear a face mask, if you have one, to prevent exposing other people to the virus. Where can you get the latest information? The following health organizations are tracking and studying this virus. Their websites contain the most up-to-date information. Giancarlo Flynn also learn what to do if you think you may have been exposed to the virus. · U.S. Centers for Disease Control and Prevention (CDC): The CDC provides updated news about the disease and travel advice. The website also tells you how to prevent the spread of infection. www.cdc.gov  · World Health Organization [facility]): WHO offers information about the virus outbreaks.  WHO also has travel advice. www.who.int  Current as of: April 1, 2020               Content Version: 12.4  © 1032-3932 [employer]. Care instructions adapted under license by your healthcare professional. If you have questions about a medical condition or this instruction, always ask your healthcare professional. [address] any warranty or liability for your use of this information.

## 2021-08-09 NOTE — H&P
[address]  [address] [facility], [address] [place] [address]      History and Physical       NAME:  Marquis Mills   :   1955   MRN:   [id number]             History of Present Illness:  Patient is a 74 y.o. who is seen for h/o colon polyps. PMH:  Past Medical History:   Diagnosis Date    Arthritis     Asthma     Hypertension     Other ill-defined conditions(799.89)     fibromyalgia    Other ill-defined conditions(799.89)     irregular heart beat    Other ill-defined conditions(799.89)     edema legs    Psychiatric disorder     depression    Psychiatric disorder     anxiety    PUD (peptic ulcer disease)        PSH:  Past Surgical History:   Procedure Laterality Date    HX GYN      d&c    HX HEENT      keloids removed from ears    HX HEENT      tonsillectomy    HX ORTHOPAEDIC      right ankle    [place] BREAST SURGERY PROCEDURE UNLISTED      sebaceous cyst removal       Allergies: Allergies   Allergen Reactions    Augmentin [Amoxicillin-Pot Clavulanate] Anaphylaxis    Sulfa (Sulfonamide Antibiotics) Anaphylaxis    Venom-Honey Bee Anaphylaxis    Avelox [Moxifloxacin] Unknown (comments)    Blueberry Rash    Celebrex [Celecoxib] Rash    Codeine Rash    Ibuprofen Rash    Lodine [Etodolac] Unknown (comments)    Nsaids (Non-Steroidal Anti-Inflammatory Drug) Rash     Some nsaids are tolerated    Shellfish Derived Hives    Soma [Carisoprodol] Unknown (comments)    Tequin [Gatifloxacin] Unknown (comments)       Home Medications:  Prior to Admission Medications   Prescriptions Last Dose Informant Patient Reported? Taking? OTHER 2021  Yes No   Sig: Hylands leg cramps OTC   OTHER 2021 at Unknown time  Yes Yes   Sig: allevert   amLODIPine (NORVASC) 10 mg tablet 2021 at Unknown time  Yes Yes   Sig: Take  by mouth daily. Not sure of dose   ascorbic acid, vitamin C, (Vitamin C) 250 mg tablet 2021 at Unknown time  Yes Yes   Sig: Take  by mouth daily.    aspirin (ASPIRIN) 325 mg tablet 2021  Yes No   Si tab(s)   benazepril-hydroCHLOROthiazide (LOTENSIN HCT) 20-25 mg per tablet 2021 at Unknown time  Yes Yes   Si tab(s)   buPROPion XL (WELLBUTRIN XL) 300 mg XL tablet 2021 at Unknown time  Yes Yes   carboxymethylcellulose sodium (REFRESH LIQUIGEL) 1 % dlgl ophthalmic solution 2021 at Unknown time  Yes Yes   Sig: Apply 1 Drop to eye as needed. carvedilol (COREG) 12.5 mg tablet 2021 at Unknown time  Yes Yes   Sig: Take 25 mg by mouth two (2) times daily (with meals). cholecalciferol (Vitamin D3) 25 mcg (1,000 unit) cap 2021 at Unknown time  Yes Yes   Sig: Take  by mouth daily. Not sure of dose   epinephrine (EPIPEN INJECTION) Unknown at Unknown time  Yes No   Sig: by Injection route.   esomeprazole (NEXIUM) 20 mg capsule 2021  Yes No   Sig: Take 40 mg by mouth two (2) times a day. fluticasone propion-salmeteroL (Advair Diskus) 100-50 mcg/dose diskus inhaler 2021 at Unknown time  Yes Yes   Sig: Take 1 Puff by inhalation every twelve (12) hours. fluticasone propionate (FLONASE NA) 2021 at Unknown time  Yes Yes   Sig: by Nasal route. hydroxyzine HCL (ATARAX) 25 mg tablet 2021 at Unknown time  Yes Yes   Si-4 tabs   ketoconazole (NIZORAL) 2 % topical cream Unknown at Unknown time  Yes No   meloxicam (MOBIC) 15 mg tablet 2021  Yes No   methocarbamol (ROBAXIN) 500 mg tablet 2021 at Unknown time  Yes Yes   Sig: TAKE 2 TABLET BY MOUTH 3 TIMES A DAY AS NEEDED   metroNIDAZOLE (FlagyL) 500 mg tablet 2021 at Unknown time  Yes Yes   Sig: Take 500 mg by mouth two (2) times a day. mupirocin (BACTROBAN) 2 % ointment Unknown at Unknown time  Yes No   naltrexone (DEPADE) 50 mg tablet 2021 at Unknown time  Yes Yes   Sig: TAKE 1 TABLET BY MOUTH EVERY DAY   naproxen sodium (ALEVE) 220 mg tablet 2021 at Unknown time  Yes Yes   Sig: Take 220 mg by mouth two (2) times daily (with meals).    olopatadine (Pataday) 0.2 % drop ophthalmic solution 2021 at Unknown time  Yes Yes   Sig: Administer 1 Drop to both eyes daily. omega-3 acid ethyl esters (LOVAZA) 1 gram capsule 2021 at Unknown time  Yes Yes   Sig: Take  by mouth daily (with breakfast). potassium chloride (K-DUR, KLOR-CON) 20 mEq tablet 2021  Yes No   Si tab(s)   spironolactone (ALDACTONE) 25 mg tablet 2021 at Unknown time  Yes Yes   Sig: Take  by mouth daily.       Facility-Administered Medications: None       Hospital Medications:  Current Facility-Administered Medications   Medication Dose Route Frequency    0.9% sodium chloride infusion  50 mL/hr IntraVENous CONTINUOUS    sodium chloride (NS) flush 5-40 mL  5-40 mL IntraVENous Q8H    sodium chloride (NS) flush 5-40 mL  5-40 mL IntraVENous PRN    midazolam (VERSED) injection 0.25-5 mg  0.25-5 mg IntraVENous Multiple    fentaNYL citrate (PF) injection  mcg   mcg IntraVENous Multiple    naloxone (NARCAN) injection 0.4 mg  0.4 mg IntraVENous Multiple    flumazeniL (ROMAZICON) 0.1 mg/mL injection 0.2 mg  0.2 mg IntraVENous Multiple    simethicone (MYLICON) 02JD/4.7QM oral drops 80 mg  1.2 mL Oral Multiple    atropine injection 0.5 mg  0.5 mg IntraVENous ONCE PRN    EPINEPHrine (ADRENALIN) 0.1 mg/mL syringe 1 mg  1 mg Endoscopically ONCE PRN     Facility-Administered Medications Ordered in Other Encounters   Medication Dose Route Frequency    0.9% sodium chloride infusion   IntraVENous CONTINUOUS       Social History:  Social History     Tobacco Use    Smoking status: Never Smoker    Smokeless tobacco: Never Used   Substance Use Topics    Alcohol use: No       Family History:  Family History   Problem Relation Age of Onset    Cancer Brother 72        prostate    Hypertension Brother     Glaucoma Brother     Hypertension Brother     Hypertension Mother          The patient was counseled at length about the risks of morteza Covid-19 in the amrita-operative and post-operative states including the recovery window of their procedure. The patient was made aware that morteza Covid-19 after a surgical procedure may worsen their prognosis for recovering from the virus and lend to a higher morbidity and or mortality risk. The patient was given the options of postponing their procedure. All of the risks, benefits, and alternatives were discussed. The patient does  wish to proceed with the procedure. Review of Systems:      Constitutional: negative fever, negative chills, negative weight loss  Eyes:   negative visual changes  ENT:   negative sore throat, tongue or lip swelling  Respiratory:  negative cough, negative dyspnea  Cards:  negative for chest pain, palpitations, lower extremity edema  GI:   See HPI  :  negative for frequency, dysuria  Integument:  negative for rash and pruritus  Heme:  negative for easy bruising and gum/nose bleeding  Musculoskel: negative for myalgias,  back pain and muscle weakness  Neuro: negative for headaches, dizziness, vertigo  Psych:  negative for feelings of anxiety, depression       Objective:     Patient Vitals for the past 8 hrs:   BP Temp Pulse Resp SpO2 Height Weight   08/09/21 1045 (!) 147/79 97.1 °F (36.2 °C) (!) 51 9 99 % 5' 4\" (1.626 m) 151.5 kg (334 lb)     No intake/output data recorded. No intake/output data recorded. EXAM:     NEURO-a&o   HEENT-wnl   LUNGS-clear    COR-regular rate and rhythym     ABD-soft , no tenderness, no rebound, good bs     EXT-no edema     Data Review     No results for input(s): WBC, HGB, HCT, PLT, HGBEXT, HCTEXT, PLTEXT in the last 72 hours. No results for input(s): NA, K, CL, CO2, BUN, CREA, GLU, PHOS, CA in the last 72 hours. No results for input(s): AP, TBIL, TP, ALB, GLOB, GGT, AML, LPSE in the last 72 hours. No lab exists for component: SGOT, GPT, AMYP, HLPSE  No results for input(s): INR, PTP, APTT, INREXT in the last 72 hours.        Assessment:     · H/o colon polyps     Patient Active Problem List   Diagnosis Code    Obesity, morbid ([facility].) E66.01           Plan:   ·   · Endoscopic procedure with sedation     Signed By: Sam Clancy MD     8/9/2021  11:12 AM

## 2021-08-09 NOTE — ANESTHESIA POSTPROCEDURE EVALUATION
Procedure(s):  COLONOSCOPY. MAC    Anesthesia Post Evaluation      Multimodal analgesia: multimodal analgesia used between 6 hours prior to anesthesia start to PACU discharge  Patient location during evaluation: bedside  Patient participation: complete - patient participated  Level of consciousness: awake  Pain score: 0  Pain management: satisfactory to patient  Airway patency: patent  Anesthetic complications: no  Cardiovascular status: acceptable and blood pressure returned to baseline  Respiratory status: acceptable  Hydration status: acceptable  Comments: I have evaluated the patient and meets criteria for discharge from PACU. Vergil Halsted, DO. Post anesthesia nausea and vomiting:  none  Final Post Anesthesia Temperature Assessment:  Normothermia (36.0-37.5 degrees C)      INITIAL Post-op Vital signs:   Vitals Value Taken Time   /39 08/09/21 1150   Temp 36.1 °C (96.9 °F) 08/09/21 1150   Pulse 52 08/09/21 1152   Resp 31 08/09/21 1153   SpO2 100 % 08/09/21 1153   Vitals shown include unvalidated device data.

## 2021-08-09 NOTE — ANESTHESIA PREPROCEDURE EVALUATION
Relevant Problems   No relevant active problems       Anesthetic History               Review of Systems / Medical History  Patient summary reviewed, nursing notes reviewed and pertinent labs reviewed    Pulmonary            Asthma : well controlled       Neuro/Psych              Cardiovascular    Hypertension: well controlled                Comments: H/o afib, not on any blood thinner   GI/Hepatic/Renal           PUD     Endo/Other        Arthritis     Other Findings              Physical Exam    Airway  Mallampati: II  TM Distance: > 6 cm  Neck ROM: normal range of motion        Cardiovascular    Rhythm: irregular  Rate: normal         Dental  No notable dental hx       Pulmonary  Breath sounds clear to auscultation               Abdominal  GI exam deferred       Other Findings            Anesthetic Plan    ASA: 2  Anesthesia type: MAC          Induction: Intravenous  Anesthetic plan and risks discussed with: Patient

## 2021-08-09 NOTE — PROCEDURES
[address]  [facility] Eileen Gregg      Colonoscopy Operative Report    Ronaldo Garcia  [id number]  1955      Procedure Type:   Colonoscopy --diagnostic     Indications:    Personal history of colon polyps (screening only)       Pre-operative Diagnosis: see indication above    Post-operative Diagnosis:  See findings below    :  Sam Clancy MD    Surgical Assistant: Endoscopy Technician-1: Mary Payan  Endoscopy RN-1: Ángel Connelly RN    Implants:  None    Referring Provider: Honey Colvin MD      Sedation:  MAC anesthesia Propofol      Procedure Details:  After informed consent was obtained with all risks and benefits of procedure explained and preoperative exam completed, the patient was taken to the endoscopy suite and placed in the left lateral decubitus position. Upon sequential sedation as per above, a digital rectal exam was performed demonstrating internal hemorrhoids. The Olympus videocolonoscope  was inserted in the rectum and carefully advanced to the cecum, which was identified by the ileocecal valve and appendiceal orifice. The cecum was identified by the ileocecal valve and appendiceal orifice. The quality of preparation was good. The colonoscope was slowly withdrawn with careful evaluation between folds. Retroflexion in the rectum was completed . Findings:   Rectum: normal  Sigmoid: mild diverticulosis  Descending Colon: normal  Transverse Colon: normal  Ascending Colon: normal  Cecum: normal    Specimen Removed:  none    Complications: None. EBL:  None. Impression:    see findings    Recommendations: --Repeat colonoscopy in 5 years.     High fiber diet    Signed By: Sam Clancy MD     8/9/2021  11:45 AM

## 2022-03-19 PROBLEM — E66.01 OBESITY, MORBID (HCC): Status: ACTIVE | Noted: 2020-01-22

## 2022-04-13 ENCOUNTER — HOSPITAL ENCOUNTER (OUTPATIENT)
Age: 67
Setting detail: OUTPATIENT SURGERY
Discharge: HOME | End: 2022-04-13
Attending: STUDENT IN AN ORGANIZED HEALTH CARE EDUCATION/TRAINING PROGRAM | Admitting: STUDENT IN AN ORGANIZED HEALTH CARE EDUCATION/TRAINING PROGRAM
Payer: MEDICARE

## 2022-04-13 VITALS
TEMPERATURE: 99.2 F | BODY MASS INDEX: 51.91 KG/M2 | DIASTOLIC BLOOD PRESSURE: 51 MMHG | SYSTOLIC BLOOD PRESSURE: 168 MMHG | HEART RATE: 58 BPM | WEIGHT: 293 LBS | OXYGEN SATURATION: 98 % | HEIGHT: 63 IN | RESPIRATION RATE: 11 BRPM

## 2022-04-13 DIAGNOSIS — R07.9 CHEST PAIN, UNSPECIFIED TYPE: ICD-10-CM

## 2022-04-13 PROCEDURE — 93458 L HRT ARTERY/VENTRICLE ANGIO: CPT | Performed by: STUDENT IN AN ORGANIZED HEALTH CARE EDUCATION/TRAINING PROGRAM

## 2022-04-13 PROCEDURE — 77030010221 HC SPLNT WR POS TELE -B: Performed by: STUDENT IN AN ORGANIZED HEALTH CARE EDUCATION/TRAINING PROGRAM

## 2022-04-13 PROCEDURE — 77030029997 HC DEV COM RDL R BND TELE -B: Performed by: STUDENT IN AN ORGANIZED HEALTH CARE EDUCATION/TRAINING PROGRAM

## 2022-04-13 PROCEDURE — C1769 GUIDE WIRE: HCPCS | Performed by: STUDENT IN AN ORGANIZED HEALTH CARE EDUCATION/TRAINING PROGRAM

## 2022-04-13 PROCEDURE — 2709999900 HC NON-CHARGEABLE SUPPLY: Performed by: STUDENT IN AN ORGANIZED HEALTH CARE EDUCATION/TRAINING PROGRAM

## 2022-04-13 PROCEDURE — 74011250637 HC RX REV CODE- 250/637: Performed by: STUDENT IN AN ORGANIZED HEALTH CARE EDUCATION/TRAINING PROGRAM

## 2022-04-13 PROCEDURE — C1894 INTRO/SHEATH, NON-LASER: HCPCS | Performed by: STUDENT IN AN ORGANIZED HEALTH CARE EDUCATION/TRAINING PROGRAM

## 2022-04-13 PROCEDURE — 74011000250 HC RX REV CODE- 250: Performed by: STUDENT IN AN ORGANIZED HEALTH CARE EDUCATION/TRAINING PROGRAM

## 2022-04-13 PROCEDURE — 99152 MOD SED SAME PHYS/QHP 5/>YRS: CPT | Performed by: STUDENT IN AN ORGANIZED HEALTH CARE EDUCATION/TRAINING PROGRAM

## 2022-04-13 PROCEDURE — 77030008543 HC TBNG MON PRSS MRTM -A: Performed by: STUDENT IN AN ORGANIZED HEALTH CARE EDUCATION/TRAINING PROGRAM

## 2022-04-13 PROCEDURE — 77030019698 HC SYR ANGI MDLON MRTM -A: Performed by: STUDENT IN AN ORGANIZED HEALTH CARE EDUCATION/TRAINING PROGRAM

## 2022-04-13 PROCEDURE — 74011250636 HC RX REV CODE- 250/636: Performed by: STUDENT IN AN ORGANIZED HEALTH CARE EDUCATION/TRAINING PROGRAM

## 2022-04-13 PROCEDURE — 74011000636 HC RX REV CODE- 636: Performed by: STUDENT IN AN ORGANIZED HEALTH CARE EDUCATION/TRAINING PROGRAM

## 2022-04-13 PROCEDURE — 77030015766: Performed by: STUDENT IN AN ORGANIZED HEALTH CARE EDUCATION/TRAINING PROGRAM

## 2022-04-13 RX ORDER — CLONAZEPAM 0.5 MG/1
0.5 TABLET ORAL
COMMUNITY

## 2022-04-13 RX ORDER — AMLODIPINE BESYLATE 5 MG/1
5 TABLET ORAL DAILY
Status: ON HOLD | COMMUNITY
End: 2022-04-13 | Stop reason: SDUPTHER

## 2022-04-13 RX ORDER — ATORVASTATIN CALCIUM 20 MG/1
20 TABLET, FILM COATED ORAL DAILY
COMMUNITY

## 2022-04-13 RX ORDER — LEVOCETIRIZINE DIHYDROCHLORIDE 5 MG/1
5 TABLET, FILM COATED ORAL DAILY
COMMUNITY

## 2022-04-13 RX ORDER — FENTANYL CITRATE 50 UG/ML
INJECTION, SOLUTION INTRAMUSCULAR; INTRAVENOUS AS NEEDED
Status: DISCONTINUED | OUTPATIENT
Start: 2022-04-13 | End: 2022-04-13 | Stop reason: HOSPADM

## 2022-04-13 RX ORDER — AMLODIPINE BESYLATE 10 MG/1
10 TABLET ORAL DAILY
Qty: 30 TABLET | Refills: 5 | Status: SHIPPED | OUTPATIENT
Start: 2022-04-13

## 2022-04-13 RX ORDER — SIMETHICONE 125 MG
125 CAPSULE ORAL AS NEEDED
COMMUNITY

## 2022-04-13 RX ORDER — SODIUM CHLORIDE 0.9 % (FLUSH) 0.9 %
5-40 SYRINGE (ML) INJECTION AS NEEDED
Status: CANCELLED | OUTPATIENT
Start: 2022-04-13

## 2022-04-13 RX ORDER — HEPARIN SODIUM 200 [USP'U]/100ML
INJECTION, SOLUTION INTRAVENOUS
Status: COMPLETED | OUTPATIENT
Start: 2022-04-13 | End: 2022-04-13

## 2022-04-13 RX ORDER — AMLODIPINE BESYLATE 5 MG/1
5 TABLET ORAL ONCE
Status: COMPLETED | OUTPATIENT
Start: 2022-04-13 | End: 2022-04-13

## 2022-04-13 RX ORDER — ASPIRIN 81 MG/1
81 TABLET ORAL DAILY
COMMUNITY

## 2022-04-13 RX ORDER — SPIRONOLACTONE 25 MG/1
25 TABLET ORAL ONCE
Status: COMPLETED | OUTPATIENT
Start: 2022-04-13 | End: 2022-04-13

## 2022-04-13 RX ORDER — BENAZEPRIL HYDROCHLORIDE 40 MG/1
40 TABLET ORAL DAILY
COMMUNITY

## 2022-04-13 RX ORDER — HEPARIN SODIUM 1000 [USP'U]/ML
INJECTION, SOLUTION INTRAVENOUS; SUBCUTANEOUS AS NEEDED
Status: DISCONTINUED | OUTPATIENT
Start: 2022-04-13 | End: 2022-04-13 | Stop reason: HOSPADM

## 2022-04-13 RX ORDER — LIDOCAINE HYDROCHLORIDE 10 MG/ML
INJECTION, SOLUTION EPIDURAL; INFILTRATION; INTRACAUDAL; PERINEURAL AS NEEDED
Status: DISCONTINUED | OUTPATIENT
Start: 2022-04-13 | End: 2022-04-13 | Stop reason: HOSPADM

## 2022-04-13 RX ORDER — SODIUM CHLORIDE 0.9 % (FLUSH) 0.9 %
5-40 SYRINGE (ML) INJECTION EVERY 8 HOURS
Status: CANCELLED | OUTPATIENT
Start: 2022-04-13

## 2022-04-13 RX ORDER — VERAPAMIL HYDROCHLORIDE 2.5 MG/ML
INJECTION, SOLUTION INTRAVENOUS AS NEEDED
Status: DISCONTINUED | OUTPATIENT
Start: 2022-04-13 | End: 2022-04-13 | Stop reason: HOSPADM

## 2022-04-13 RX ORDER — LANOLIN ALCOHOL/MO/W.PET/CERES
400 CREAM (GRAM) TOPICAL DAILY
COMMUNITY

## 2022-04-13 RX ORDER — AMLODIPINE BESYLATE 5 MG/1
10 TABLET ORAL DAILY
Qty: 30 TABLET | Refills: 5 | Status: SHIPPED | OUTPATIENT
Start: 2022-04-13 | End: 2022-04-13 | Stop reason: SDUPTHER

## 2022-04-13 RX ORDER — MIDAZOLAM HYDROCHLORIDE 1 MG/ML
INJECTION, SOLUTION INTRAMUSCULAR; INTRAVENOUS AS NEEDED
Status: DISCONTINUED | OUTPATIENT
Start: 2022-04-13 | End: 2022-04-13 | Stop reason: HOSPADM

## 2022-04-13 RX ORDER — FUROSEMIDE 40 MG/1
40 TABLET ORAL DAILY
COMMUNITY

## 2022-04-13 RX ORDER — ACETAMINOPHEN 500 MG
1000 TABLET ORAL 2 TIMES DAILY
COMMUNITY

## 2022-04-13 RX ORDER — LAMOTRIGINE 100 MG/1
100 TABLET ORAL DAILY
COMMUNITY

## 2022-04-13 RX ADMIN — SPIRONOLACTONE 25 MG: 25 TABLET ORAL at 14:47

## 2022-04-13 RX ADMIN — AMLODIPINE BESYLATE 5 MG: 5 TABLET ORAL at 14:47

## 2022-04-13 NOTE — PROGRESS NOTES
Patient ambulated in room with minimal assistance from RN; no complaints of discomfort, dizziness, sob. Right radial site clean dry and intact. Discharge instructions reviewed with patient and patients son; answered questions as needed. Transported patient in wheelchair to car.

## 2022-04-13 NOTE — PROGRESS NOTES
Cardiac Cath Lab Recovery Arrival Note:      Edil Hall arrived to Cardiac Cath Lab, Recovery Area. Staff introduced to patient. Patient identifiers verified with NAME and DATE OF BIRTH. Procedure verified with patient. Consent forms reviewed and signed by patient or authorized representative and verified. Allergies verified. Patient and family oriented to department. Patient and family informed of procedure and plan of care. Questions answered with review. Patient prepped for procedure, per orders from physician, prior to arrival.    Patient on cardiac monitor, non-invasive blood pressure, SPO2 monitor. On RA. Patient is A&Ox 4. Patient reports no complaints. Primary Nurse Justin Moore RN and Malu Wallace RN performed a dual skin assessment on this patient No impairment noted  Faustino score is 23, foam dressing applied to sacrum/coccyx       Patient in stretcher, in low position, with side rails up, call bell within reach, patient instructed to call if assistance as needed. Patient prep in: [address], [place]. Patient family has pager #   Family in: [facility] waiting room, Ara Parmar (son).    Prep by: Geronimo Mcbride RN's

## 2022-04-13 NOTE — H&P
__________________    Patient was seen by me and seen by Dr. Ritu Watts for high PVC burden, has PET scan which shows inferior apical perfusion abnormality, left heart catheterization was recommended, patient agreed to proceed. ___________________    Marla Purdy  69year old F (1955)  Account #: [de-identified]  PRIMARY CARE PHYSICIAN:  Piedad Ventura MD  CARDIOLOGIST:  Gwen Price MD    REASON FOR VISIT:  This 69year old female presents for pvc's. HISTORY OF PRESENT ILLNESS:     1. H/o systolic HF, CMP with EF 76-42% long time ago, EF normalized   2. Irregular heart beat, PACs, PVCs  4. Hypertension  5. Hyperlipidemia  6. Lymphedema   7. MAYI, awaiting for CPAP  8. Obesity    Here for follow up. Over all stable from cardiac standpoint. Has chronic lymphedema and edema. stable. But does report has gained weight could be due to fluid retention. No PND or orthopnea. Walks with cane. Dyspnea with over exertion - stable, unchanged. Has sinus nicolas with HR 58 and Bigeminy on EKG. no symptoms of CP, palpitations, fatigue and tiredness or syncope. 1/2022 EP New Patient:  Denies syncope, dizziness, palpitations. No chest, jaw, neck, shoulder, or arm pain. No orthopnea, PND, or edema. Patient denies claudication. There is no discoloration or ulceration of the lower extremities. The patient has had no TIA or stroke-like symptoms. CARDIAC HISTORY  CHF/CM:  1 cardiomyopathy - 6/2012     ARRHYTHMIA:  1 PVCs - 5/2012     RISK FACTORS:  1 Hypertension   2 Dyslipidemia   3 Tobacco Use: No/never       CARDIOVASCULAR PROCEDURES  Procedure Date Results   Fidel MPI 06/28/2012 EF 0.32, abnormal, no reversibility   Duplex 03/10/2014 1-15% ICA's bilat. Vert patent antegrade bilat. Echo 07/20/2016 EF: .60, No pericardial effusion. Minimal aortic valve sclerosis without stenosis. Slightly dilated left atrium. Mild concentric LVH with well-preserved LV systolic function.   Normal estimated right heart pressures Echo 2013 EF 0.55 (55%), LVH   EKG 2021 SB, Bigeminy,.  low voltage, NSTT abn       ACTIVE ALLERGIES:  Ingredient Reaction Comment   CELECOXIB     GATIFLOXACIN     MOXIFLOXACIN HCL     PENICILLIN Rash    BLUEBERRY     SHELLFISH DERIVED     CARISOPRODOL     CODEINE Hives Codeine   AMOXICILLIN TRIHYDRATE  Augmentin   CELECOXIB  Celebrex   SULFA (SULFONAMIDE ANTIBIOTICS) Hives Sulfonamides   ETODOLAC Unknown Lodine   POTASSIUM CLAVULANATE  Augmentin   BEE POLLENS  Bee Stings   IBUPROFEN  Ibuprofen       PROBLEM LIST:  Problem Description Chronic   Hypercholesterolemia Y   Benign hypertension Y   Cardiomyopathy Y   PVCs Y       PAST MEDICAL/SURGICAL HISTORY  (Detailed)    Disease/disorder Onset Date Surgical History Date Comments     Right Ankle Surgery       Tonsillectomy             Arthritis       Allergy       Anxiety       Arthritis       Asthma       Cardiac Arrythmia       Cardiovascular Disease       Depression, with Anxiety       Diverticulitis       Fibromyalgia       GERD       Headache Migrane       Hypercholesterolemia       Hyperlipidemia       Hypertension       Lymphedema       Migraines       Neurotic Depression       Thyroiditis         Family History  (Detailed)  Relationship Family Member Name  Age at Death Condition Onset Age Cause of Death   Brother    High Blood Pressure     Brother    Irregular Heart Rhythm     Brother  N  Pulmonary Embolus  N   Brother 2 N  Pulmonary Embolus  N   Father  Y  Cirrhosis  Y   Mother  Y  Dementia, DVT,PE,High cholesterol  Y   Mother    High Blood Pressure     Mother    Irregular Heart Rhythm       SOCIAL HISTORY  (Detailed)  Tobacco use reviewed. Preferred language is [place]. EDUCATION/EMPLOYMENT/OCCUPATION  Employment History Status Retired Restrictions                   retired       retired       MARITAL STATUS/FAMILY/SOCIAL SUPPORT  Currently . 0 son(s). 0 daughter(s). Smoking status: Never smoker.         ALCOHOL  There is a history of alcohol use, but no current usage. CAFFEINE  The patient uses caffeine: coffee and soda. [employer] LIFESTYLE  Never exercises. REVIEW OF SYMPTOMS:    CONST - Negative for weight loss, fever. Positive for weight gain. EYES - Negative for visual changes. ENT - Negative for hearing loss. RESP - Negative for snoring, hemoptysis, dyspnea. CARD - Negative for chest pain, diaphoresis, orthopnea, syncope, PND. Positive for palpitation. VASC - Positive for claudication, edema. GI - Negative for nausea, reflux, bleeding.  - Negative for hematuria, nocturia. REPROD - Negative for Hx of OCP.  ENDO - Negative for goiter, tremors. NEURO - Negative for memory loss, seizures. Positive for dizziness. PSYCH - Negative for hallucinations. Positive for depression. DERM - Negative for rash, skin sores. M/S - Positive for joint pain, myalgia. HEMAT - Negative for acute anemia, thrombocytopenia. VITAL SIGNS  Time BP mm/Hg Pulse /min Resp /min Temp F Ht ft Ht in Ht cm Wt lb Wt kg BMI kg/m2 BSA m2 O2 Sat%   12:05 /80 61   5.0 4.00 162.56 327.80 148.688 56.27         PHYSICAL EXAM:  Exam Findings Details   Const Neg Level of Distress - Awake / Alert, No Acute Distress. Nourishment - Well Nourished. Eyes Neg Lids/External - Bilateral Normal.  Conjunctiva - Bilateral Normal.   NMT Neg Oral Mucosa - Moist, No Cyanosis, No Pallor. Neck Neg JVP - Less Than 8. Resp Neg Respirations - Nonlabored. Breath Sounds - Clear Throughout. Rales - Absent. Wheezes - Absent. Rhonchi - Absent. Cardiac Neg Rhythm - Regular. Palpation - PMI Normal.  Heart Sounds - S1 Normal, S2 Normal, No S3, No S4. Extra Sounds - None. Murmurs - None. Vasc Neg Carotid - Bilateral Normal Pulse. Abd Neg Tenderness - None. Hepatomegaly - Absent. Splenomegaly - Absent. M/S Neg Gait - Normal.  Able to Exercise - Yes. EXT Neg Clubbing - Absent. Ischemic Ulcers - Absent.    EXT Pos Lower Extremity Edema - Severe, Bilateral.   Skin Neg Skin - Warm. Venous Stasis Ulcers - Absent. Psych Neg Orientation - Oriented to Time, Person, Place. IMPRESSION AND PLAN  01. Ventricular bigeminy (I49.8):  PVC burden was 10% by recent Holter 12/2021, she says that she was monitored for only 12 hours due to leads falling off. ECG done 14 Day holter to be done. I need to have a good sample size to determine what can be offered at the next step. She did not have enough data on the last Holter to determine a course. Will go with system that doesn't have wires but uses an adhesive for the monitor itself. FURTHER RECOMMENDATIONS PENDING THE RESULT OF HER MONITOR. 02. Bradycardia, unspecified (R00.1):  Asymptomatic. She is aware that during V bigeminy, her monitoring equipment may suggest bradycardia.   03. Dilated cardiomyopathy (I42.0): The patient's systolic function has normalized. Her ejection fraction is greater than 35%. Well controlled on medical therapy. Will continue benazepril, furosemide, coreg, and spironolactone. I have made no changes to the present regimen. 04. Essential hypertension (I10):  Med changes as above. she has bradycardia but asymptomatic. Continue to monitor. If needed decreased dose of Coreg. 05. Lymphedema (I89.0):  Chronic. Compression stoking, f/u with lymphedema clinic.   06. Hyperlipidemia (E78.5): Tolerating statin therapy. 07. Obstructive sleep apnea (G47.33): Now on a CPAP machine   08. Long term (current) use of aspirin (Z79.82): No apparent bleeding. 09. Morbid obesity due to excess calories (E66.01)   10. Body mass index [BMI] 50.0-59.9, adult (J87.92): The patient was instructed on AHA diet and regular exercise. ORDERS:   Dietary management education, guidance, and counseling    1 ECG done    2 The patient was instructed on AHA diet and regular exercise.     3 14 Day holter to be done        FINAL MEDICATION LIST  Medication Sig Desc Non-VCS   Advair Diskus 250 mcg-50 mcg/dose powder for inhalation inhale 1 puff by inhalation route 2 times every day in the morning and evening approximately 12 hours apart N   Alavert 10 mg disintegrating tablet take 1 tablet by oral route  every day and place on top of the tongue where it will dissolve, then swallow Y   amlodipine 5 mg tablet take 1 tablet by oral route  every day N   aspirin 325 mg Tab as needed N   atorvastatin 20 mg tablet take 1 tablet by oral route  every day N   benazepril 40 mg tablet take 1 tablet by oral route  every day N   bupropion HCl  mg 24 hr tablet, extended release take 1 tablet by oral route  every day N   clindamycin 1 % lotion apply by topical route 2 times every day a thin layer to the affected area(s) Y   clonazepam 0.5 mg tablet take 1 tablet by oral route  every day as needed Y   Coreg 25 mg tablet TAKE 1 TABLET BY MOUTH TWICE A DAY WITH FOOD N   EpiPen 0.3 mg/0.3 mL injection, auto-injector inject 0.3 milliliter by intramuscular route once as needed for anaphylaxis as needed Y   Flagyl 375 mg capsule 500mg bid prn Y   Flonase Allergy Relief 50 mcg/actuation nasal spray,suspension spray 1 - 2 spray by intranasal route  every day in each nostril as needed Y   Gas-X Extra Strength 125 mg capsule  as needed N   hydroxyzine HCl 25 mg tablet take 1 tablet 2 times daily N   ipratropium bromide 0.03 % nasal spray spray 2 spray by intranasal route 2- 3 times every day in each nostril Y   ketoconazole 2 % topical cream apply by topical route  every day to the affected area(s) as needed N   lamotrigine 25 mg tablet take 2 tablet by oral route  every day Y   Lasix 40 mg tablet take 1 tablet by oral route  every day N   magnesium oxide 400 mg (241.3 mg magnesium) tablet take one tablet daily N   methocarbamol 500 mg tablet take 1 Tablet by oral route 3 times every day Y   naltrexone 50 mg tablet take 1 tablet by oral route  every day N   naproxen sodium 220 mg tablet take 2 tablet by oral route  every 12 hours as needed N   Nexium 20 mg capsule,delayed release take 1 capsule by oral route  every day at least 1 hour before a meal swallowing whole. Do not crush or chew granules.  as needed N   Pataday Twice Daily Relief 0.1 % eye drops instill 1 drop by ophthalmic route 2 times every day into affected eye(s) at an interval of 6 to 8 hours Y   potassium chloride ER 10 mEq capsule,extended release take 2 capsule by oral route  every day with food N   Refresh Tears 0.5 % eye drops  Y   SPIRONOLACTONE 25 MG TABLET TAKE 1 TABLET BY MOUTH EVERY DAY N   Super Twin EPA-DHA 1,250 mg capsule 2 po qd Y   terbinafine HCl 250 mg tablet take 1 tablet by oral route  every day Y   Tylenol PM Extra Strength 25 mg-500 mg tablet take 2 tablet by oral route 2 times every day at bedtime Y   Vitamin C 1,000 mg tablet 1 po bid Y   Vitamin D2 1,250 mcg (50,000 unit) capsule 1 po qweekly Y   Xyzal 5 mg tablet take 1 tablet by oral route  every day in the evening Y       Electronically signed by:       Malcolm Ramey MD 01/24/2022  @ 1:24 PM    cc:    Taylor Kenyon MD     Document generated by: Prasanth Cuellar 01/24/2022

## 2022-04-13 NOTE — Clinical Note
TRANSFER - OUT REPORT:     Verbal report given to: (at bedside). Report consisted of patient's Situation, Background, Assessment and   Recommendations(SBAR). Opportunity for questions and clarification was provided. Patient transported with a Registered Nurse and [address] [place] [address] / [facility] [employer]. Patient transported to: recovery.

## 2022-04-13 NOTE — PROGRESS NOTES
TRANSFER - IN REPORT:    Verbal report received from harely garcia  on Dari Fine  being received from cath lab for routine progression of care. Report consisted of patients Situation, Background, Assessment and Recommendations(SBAR). Information from the following report(s) SBAR was reviewed with the receiving clinician. Opportunity for questions and clarification was provided. Assessment completed upon patients arrival to [address] [place] [address] care assumed. Cardiac Cath Lab Recovery Arrival Note:    Dari Fine arrived to [facility] recovery area. Patient procedure= heart cath. Patient on cardiac monitor, non-invasive blood pressure, SPO2 monitor. On RA . IV  of nacl on pump at kvo ml/hr. Patient status doing well without problems. Patient is A&Ox 4. Patient reports no complaints. PROCEDURE SITE CHECK:    Procedure site:without any bleeding and no hematoma, no pain/discomfort reported at procedure site. No change in patient status. Continue to monitor patient and status.

## 2022-04-13 NOTE — CARDIO/PULMONARY
Cardiac Rehab Note: chart review/referral     Cardiac cath without intervention 4/13/22:  \"Non obstructive CAD \"    Smoking history assessed. Patient is a non smoker.    Smoking Cessation Program link has not been added to the AVS.

## 2022-04-13 NOTE — DISCHARGE INSTRUCTIONS
[address] [facility], [address]   [phone]  [address]    [email or web address]    Patient Discharge Instructions    Carolina Jiménez / [id number] : 1955    Admitted 2022 Discharged: 2022       · It is important that you take the medication exactly as they are prescribed. · Keep your medication in the bottles provided by the pharmacist and keep a list of the medication names, dosages, and times to be taken in your wallet. · Do not take other medications without consulting your doctor. BRING ALL OF YOUR MEDICINES TO YOUR OFFICE VISIT with Amberly Read MD or my nurse practitioner. Follow-up with Amberly Read MD or my nurse practitioner in 2 weeks. Cardiac Catheterization  Discharge Instructions    Transradial Catheterization Discharge Instructions (WRIST)    Discharge instructions: Your radial artery in your wrist was used for your cardiac catheterization. This site may be slightly bruised and sore following your procedure. Expect mild tingling or the hand and tenderness at the puncture site for up to 3 days. Excess movement of the wrist used should be avoided for the next 24-48 hours. 1. No lifting over 2 pounds (approximately a ½ gallon of milk) with this arm for 24 hours. 2. Keep the site of the procedure covered with a bandage for 24 hours. 3. You may shower the day after your procedure. Do not take a tub bath or submerge the puncture site in water for 48 hours. 4. No heavy impact activity/lifting > 10 pounds for 1 week. If bleeding of the wrist occurs at home:   If the site on your wrist where you had the catheterization procedure begins to bleed, do not panic. 1. Place 1 or 2 fingers over the puncture site and hold pressure to stop the bleeding. You may be able to feel your pulse as you hold pressure. 2. Lift your fingers after 5 minutes to see if the bleeding has stopped.    3. Once the bleeding has stopped, gently wipe the wrist area clean and cover with a bandage. If the bleeding from your wrist does not stop after 15 minutes, or if there is a large amount of bleeding or spurting, call 911 immediately (do not drive yourself to the hospital). Other concerns: The site may be slightly bruised and sore following your procedure. Should any of the following occur, contact your physician immediately:   1. Any cool or coldness of the arm, discoloration over a large area, ongoing numbness or any abnormal sensations , moderate to severe pain or swelling in the arm. 2. Redness, soreness, swelling, chills or fever, or colored drainage at the procedure site within 3-7 days after your procedure. If you have any further questions or concerns regarding your procedure please call the Cardiac Cath Lab office at [phone]. During regular business hours ask to speak to Dr. Karen Alicea. During non-business hours the answering service will answer. Ask to speak to the physician on call for [place] [facility].     Transfemoral Catheterization Discharge Instructions Kalli Marie)     Do not drive, operate any machinery, or sign any legal documents for 24 hours after your procedure. You must have someone to drive you home.  You may take a shower 24 hours after your cardiac catheterization. Be sure to get the dressing wet and then remove it; gently wash the area with warm soapy water. Pat dry and leave open to air. To help prevent infections, be sure to keep the cath site clean and dry. No lotions, creams, powders, ointments, etc. in the cath site for approximately 1 week.  Do not take a tub bath, get in a hot tub or swimming pool for approximately 5 days or until the cath site is completely healed.  No strenuous activity or heavy lifting over 10 lbs. for 7 days.  Drink plenty of fluids for 24-48 hours after your cath to flush the contrast dye from your kidneys. No alcoholic beverages for 24 hours.   You may resume your previous diet (low fat, low cholesterol) after your cath.  After your cath, some bruising or discomfort is common during the healing process. Tylenol, 1-2 tablets every 6 hours as needed, is recommended if you experience any discomfort. If you experience any signs or symptoms of infection such as fever, chills, or poorly healing incision, persistent tenderness or swelling in the groin, redness and/or warmth to the touch, numbness, significant tingling or pain at the groin site or affected extremity, rash, drainage from the cath site, or if the leg feels tight or swollen, call your physician right away.  If bleeding at the cath site occurs, take a clean gauze pad and apply direct pressure to the groin just above the puncture site. Call 911 immediately, and continue to apply direct pressure until an ambulance gets to your location.  You may return to work  2  days after your cardiac cath if no groin bleeding. Information obtained by :  I understand that if any problems occur once I am at home I am to contact my physician. I understand and acknowledge receipt of the instructions indicated above. R.N.'s Signature                                                                  Date/Time                                                                                                                                              Patient or Representative Signature                                                          Date/Time      Martínez Ken MD             [address] [facility], [address] [phone]  [place], [address] [facility]    [email or web address]

## 2022-04-13 NOTE — PROGRESS NOTES
Brief Procedure Note:         Indication:   Abn stress test     Procedure:   LHC, Cors     Complications: None   Blood loss: Minimal   Condition: Stable     Brief procedure Result:   Non obstructive CAD   Elevated left sided filling pressures     Recommendations:   Medical therapy and risk factor mx   Increase amlodipine to 10 mg for elevated BP     Full note and recommendations to follow. '

## 2022-10-25 ENCOUNTER — TRANSCRIBE ORDER (OUTPATIENT)
Dept: SCHEDULING | Age: 67
End: 2022-10-25

## 2022-10-25 DIAGNOSIS — R22.0 INTRACRANIAL SWELLING: Primary | ICD-10-CM

## 2022-10-31 ENCOUNTER — HOSPITAL ENCOUNTER (OUTPATIENT)
Dept: ULTRASOUND IMAGING | Age: 67
Discharge: HOME | End: 2022-10-31
Attending: DERMATOLOGY
Payer: MEDICARE

## 2022-10-31 DIAGNOSIS — R22.0 INTRACRANIAL SWELLING: ICD-10-CM

## 2022-10-31 PROCEDURE — 76705 ECHO EXAM OF ABDOMEN: CPT

## 2024-01-09 ENCOUNTER — TELEPHONE (OUTPATIENT)
Age: 69
End: 2024-01-09

## 2024-01-09 NOTE — TELEPHONE ENCOUNTER
Patient called and left voicemail today. Asked to speak with whoever handles insurance here. Would be a new patient.    Returned call, left message.

## 2024-04-10 ENCOUNTER — HOSPITAL ENCOUNTER (EMERGENCY)
Facility: HOSPITAL | Age: 69
Discharge: HOME | End: 2024-04-10
Payer: MEDICARE

## 2024-04-10 ENCOUNTER — APPOINTMENT (OUTPATIENT)
Facility: HOSPITAL | Age: 69
End: 2024-04-10
Payer: MEDICARE

## 2024-04-10 VITALS
BODY MASS INDEX: 51.91 KG/M2 | TEMPERATURE: 97.7 F | SYSTOLIC BLOOD PRESSURE: 168 MMHG | WEIGHT: 293 LBS | RESPIRATION RATE: 16 BRPM | HEART RATE: 59 BPM | HEIGHT: 63 IN | OXYGEN SATURATION: 97 % | DIASTOLIC BLOOD PRESSURE: 82 MMHG

## 2024-04-10 DIAGNOSIS — R10.30 LOWER ABDOMINAL PAIN: Primary | ICD-10-CM

## 2024-04-10 DIAGNOSIS — N28.9 RENAL LESION: ICD-10-CM

## 2024-04-10 LAB
ALBUMIN SERPL-MCNC: 3.2 G/DL (ref 3.5–5)
ALBUMIN/GLOB SERPL: 1.1 (ref 1.1–2.2)
ALP SERPL-CCNC: 108 U/L (ref 45–117)
ALT SERPL-CCNC: 19 U/L (ref 12–78)
ANION GAP SERPL CALC-SCNC: 7 MMOL/L (ref 5–15)
APPEARANCE UR: CLEAR
AST SERPL-CCNC: 16 U/L (ref 15–37)
BACTERIA URNS QL MICRO: NEGATIVE /HPF
BASOPHILS # BLD: 0 K/UL (ref 0–0.1)
BASOPHILS NFR BLD: 1 % (ref 0–1)
BILIRUB SERPL-MCNC: 0.4 MG/DL (ref 0.2–1)
BILIRUB UR QL: NEGATIVE
BUN SERPL-MCNC: 30 MG/DL (ref 6–20)
BUN/CREAT SERPL: 26 (ref 12–20)
CALCIUM SERPL-MCNC: 9 MG/DL (ref 8.5–10.1)
CHLORIDE SERPL-SCNC: 109 MMOL/L (ref 97–108)
CO2 SERPL-SCNC: 26 MMOL/L (ref 21–32)
COLOR UR: ABNORMAL
CREAT SERPL-MCNC: 1.15 MG/DL (ref 0.55–1.02)
DIFFERENTIAL METHOD BLD: ABNORMAL
EOSINOPHIL # BLD: 0.4 K/UL (ref 0–0.4)
EOSINOPHIL NFR BLD: 8 % (ref 0–7)
EPITH CASTS URNS QL MICRO: ABNORMAL /LPF
ERYTHROCYTE [DISTWIDTH] IN BLOOD BY AUTOMATED COUNT: 15.3 % (ref 11.5–14.5)
GLOBULIN SER CALC-MCNC: 3 G/DL (ref 2–4)
GLUCOSE SERPL-MCNC: 97 MG/DL (ref 65–100)
GLUCOSE UR STRIP.AUTO-MCNC: NEGATIVE MG/DL
GRAN CASTS URNS QL MICRO: ABNORMAL /LPF
HCT VFR BLD AUTO: 31.9 % (ref 35–47)
HGB BLD-MCNC: 10.1 G/DL (ref 11.5–16)
HGB UR QL STRIP: NEGATIVE
IMM GRANULOCYTES # BLD AUTO: 0 K/UL (ref 0–0.04)
IMM GRANULOCYTES NFR BLD AUTO: 0 % (ref 0–0.5)
KETONES UR QL STRIP.AUTO: ABNORMAL MG/DL
LEUKOCYTE ESTERASE UR QL STRIP.AUTO: NEGATIVE
LIPASE SERPL-CCNC: 16 U/L (ref 13–75)
LYMPHOCYTES # BLD: 1.6 K/UL (ref 0.8–3.5)
LYMPHOCYTES NFR BLD: 30 % (ref 12–49)
MCH RBC QN AUTO: 26.9 PG (ref 26–34)
MCHC RBC AUTO-ENTMCNC: 31.7 G/DL (ref 30–36.5)
MCV RBC AUTO: 84.8 FL (ref 80–99)
MONOCYTES # BLD: 0.6 K/UL (ref 0–1)
MONOCYTES NFR BLD: 12 % (ref 5–13)
NEUTS SEG # BLD: 2.8 K/UL (ref 1.8–8)
NEUTS SEG NFR BLD: 49 % (ref 32–75)
NITRITE UR QL STRIP.AUTO: NEGATIVE
NRBC # BLD: 0 K/UL (ref 0–0.01)
NRBC BLD-RTO: 0 PER 100 WBC
PH UR STRIP: 5 (ref 5–8)
PLATELET # BLD AUTO: 225 K/UL (ref 150–400)
PMV BLD AUTO: 12 FL (ref 8.9–12.9)
POTASSIUM SERPL-SCNC: 4.6 MMOL/L (ref 3.5–5.1)
PROT SERPL-MCNC: 6.2 G/DL (ref 6.4–8.2)
PROT UR STRIP-MCNC: >300 MG/DL
RBC # BLD AUTO: 3.76 M/UL (ref 3.8–5.2)
RBC #/AREA URNS HPF: ABNORMAL /HPF (ref 0–5)
SODIUM SERPL-SCNC: 142 MMOL/L (ref 136–145)
SP GR UR REFRACTOMETRY: 1.02
URINE CULTURE IF INDICATED: ABNORMAL
UROBILINOGEN UR QL STRIP.AUTO: 1 EU/DL (ref 0.2–1)
WBC # BLD AUTO: 5.5 K/UL (ref 3.6–11)
WBC URNS QL MICRO: ABNORMAL /HPF (ref 0–4)

## 2024-04-10 PROCEDURE — 6360000004 HC RX CONTRAST MEDICATION

## 2024-04-10 PROCEDURE — 83690 ASSAY OF LIPASE: CPT

## 2024-04-10 PROCEDURE — 74177 CT ABD & PELVIS W/CONTRAST: CPT

## 2024-04-10 PROCEDURE — 36415 COLL VENOUS BLD VENIPUNCTURE: CPT

## 2024-04-10 PROCEDURE — 85025 COMPLETE CBC W/AUTO DIFF WBC: CPT

## 2024-04-10 PROCEDURE — 81001 URINALYSIS AUTO W/SCOPE: CPT

## 2024-04-10 PROCEDURE — 6370000000 HC RX 637 (ALT 250 FOR IP)

## 2024-04-10 PROCEDURE — 99285 EMERGENCY DEPT VISIT HI MDM: CPT

## 2024-04-10 PROCEDURE — 80053 COMPREHEN METABOLIC PANEL: CPT

## 2024-04-10 RX ORDER — DICYCLOMINE HCL 20 MG
20 TABLET ORAL 4 TIMES DAILY PRN
Qty: 20 TABLET | Refills: 0 | Status: SHIPPED | OUTPATIENT
Start: 2024-04-10

## 2024-04-10 RX ORDER — ONDANSETRON 4 MG/1
4 TABLET, ORALLY DISINTEGRATING ORAL 3 TIMES DAILY PRN
Qty: 21 TABLET | Refills: 0 | Status: SHIPPED | OUTPATIENT
Start: 2024-04-10

## 2024-04-10 RX ORDER — DICYCLOMINE HCL 20 MG
20 TABLET ORAL
Status: COMPLETED | OUTPATIENT
Start: 2024-04-10 | End: 2024-04-10

## 2024-04-10 RX ADMIN — DICYCLOMINE HYDROCHLORIDE 20 MG: 20 TABLET ORAL at 16:37

## 2024-04-10 RX ADMIN — IOPAMIDOL 100 ML: 755 INJECTION, SOLUTION INTRAVENOUS at 17:55

## 2024-04-10 ASSESSMENT — PAIN SCALES - GENERAL: PAINLEVEL_OUTOF10: 9

## 2024-04-10 NOTE — ED PROVIDER NOTES
hydrOXYzine HCl 25 MG tablet  Commonly known as: ATARAX     lamoTRIgine 100 MG tablet  Commonly known as: LAMICTAL     levocetirizine 5 MG tablet  Commonly known as: XYZAL     magnesium oxide 400 MG tablet  Commonly known as: MAG-OX     methocarbamol 500 MG tablet  Commonly known as: ROBAXIN     metroNIDAZOLE 500 MG tablet  Commonly known as: FLAGYL     naltrexone 50 MG tablet  Commonly known as: DEPADE     naproxen sodium 220 MG tablet  Commonly known as: ANAPROX     olopatadine 0.2 % Soln ophthalmic solution  Commonly known as: PATADAY     potassium chloride 20 MEQ extended release tablet  Commonly known as: KLOR-CON M     Simethicone 125 MG Caps     spironolactone 25 MG tablet  Commonly known as: ALDACTONE     vitamin D 25 MCG (1000 UT) Caps                DISCONTINUED MEDICATIONS:  Current Discharge Medication List        I have seen and evaluated the patient autonomously. My supervision physician was on site and available for consultation if needed.     I am the Primary Clinician of Record: REBECCA Barth NP (electronically signed)    (Please note that parts of this dictation were completed with voice recognition software. Quite often unanticipated grammatical, syntax, homophones, and other interpretive errors are inadvertently transcribed by the computer software. Please disregards these errors. Please excuse any errors that have escaped final proofreading.)     Luann Worthy APRN - NP  04/10/24 2058

## 2024-04-10 NOTE — DISCHARGE INSTRUCTIONS
Thank you!  Thank you for allowing me to care for you in the emergency department. It is my goal to provide you with excellent care.  Please fill out the survey that will come to you by mail or email since we listen to your feedback!     Below you will find a list of your tests from today's visit.  Should you have any questions, please do not hesitate to call the emergency department.    Labs  Recent Results (from the past 12 hour(s))   CBC with Auto Differential    Collection Time: 04/10/24  4:29 PM   Result Value Ref Range    WBC 5.5 3.6 - 11.0 K/uL    RBC 3.76 (L) 3.80 - 5.20 M/uL    Hemoglobin 10.1 (L) 11.5 - 16.0 g/dL    Hematocrit 31.9 (L) 35.0 - 47.0 %    MCV 84.8 80.0 - 99.0 FL    MCH 26.9 26.0 - 34.0 PG    MCHC 31.7 30.0 - 36.5 g/dL    RDW 15.3 (H) 11.5 - 14.5 %    Platelets 225 150 - 400 K/uL    MPV 12.0 8.9 - 12.9 FL    Nucleated RBCs 0.0 0  WBC    nRBC 0.00 0.00 - 0.01 K/uL    Neutrophils % 49 32 - 75 %    Lymphocytes % 30 12 - 49 %    Monocytes % 12 5 - 13 %    Eosinophils % 8 (H) 0 - 7 %    Basophils % 1 0 - 1 %    Immature Granulocytes % 0 0.0 - 0.5 %    Neutrophils Absolute 2.8 1.8 - 8.0 K/UL    Lymphocytes Absolute 1.6 0.8 - 3.5 K/UL    Monocytes Absolute 0.6 0.0 - 1.0 K/UL    Eosinophils Absolute 0.4 0.0 - 0.4 K/UL    Basophils Absolute 0.0 0.0 - 0.1 K/UL    Immature Granulocytes Absolute 0.0 0.00 - 0.04 K/UL    Differential Type AUTOMATED     CMP    Collection Time: 04/10/24  4:29 PM   Result Value Ref Range    Sodium 142 136 - 145 mmol/L    Potassium 4.6 3.5 - 5.1 mmol/L    Chloride 109 (H) 97 - 108 mmol/L    CO2 26 21 - 32 mmol/L    Anion Gap 7 5 - 15 mmol/L    Glucose 97 65 - 100 mg/dL    BUN 30 (H) 6 - 20 MG/DL    Creatinine 1.15 (H) 0.55 - 1.02 MG/DL    Bun/Cre Ratio 26 (H) 12 - 20      Est, Glom Filt Rate 51 (L) >60 ml/min/1.73m2    Calcium 9.0 8.5 - 10.1 MG/DL    Total Bilirubin 0.4 0.2 - 1.0 MG/DL    ALT 19 12 - 78 U/L    AST 16 15 - 37 U/L    Alk Phosphatase 108 45 - 117 U/L

## 2024-04-10 NOTE — ED NOTES
Pt discharged by Deacon DURAND. Discharge instructions discussed and pt given opportunity to ask questions.

## 2024-04-11 ENCOUNTER — CARE COORDINATION (OUTPATIENT)
Dept: OTHER | Facility: CLINIC | Age: 69
End: 2024-04-11

## 2024-04-11 NOTE — CARE COORDINATION
Ambulatory Care Coordination Note    ACM attempted to reach patient for introduction to Associate Care Management related to 4/10/24 ED visit. HIPAA compliant message left requesting a return phone call at patient convenience.     Plan for follow-up call in 1-2 days      No future appointments.    RAGHU Mart RN  Ambulatory Care Manager  Phone: [phone]  Email: [email or web address]

## 2024-04-16 ENCOUNTER — CARE COORDINATION (OUTPATIENT)
Dept: OTHER | Facility: CLINIC | Age: 69
End: 2024-04-16

## 2024-04-16 RX ORDER — DICLOFENAC SODIUM 75 MG/1
75 TABLET, DELAYED RELEASE ORAL 2 TIMES DAILY
COMMUNITY

## 2024-04-16 RX ORDER — BISACODYL 5 MG/1
5 TABLET, DELAYED RELEASE ORAL DAILY PRN
COMMUNITY

## 2024-04-16 RX ORDER — AMLODIPINE AND BENAZEPRIL HYDROCHLORIDE 10; 40 MG/1; MG/1
1 CAPSULE ORAL DAILY
COMMUNITY

## 2024-04-16 NOTE — CARE COORDINATION
the Zofran but states the Bentyl is helping a lot; currently taking TID. Sent home with #20 tablets; no refills. She would like to get another script for Bentyl due to its effectiveness. If approved, please send script to [facility].     Pharmacy  [id number] - [place], [place] - [address] - P [phone] - F [phone]  [address] [facility] [address]  Phone: [phone]  Fax: [phone]    Patient Name: Sheila Harris-Reyes  : 1955  PF provider: Dr. Tuyet Loaiza MD    Thank you!  Terry       Addendum: Email reply received from PF care manager.  Good Evening Terry!     I sent a message over to the center to request the RX for your patient. I will check on the status first thing in the morning!  Have a great evening!     Farhana Stovall RN-BC           Terry Huynh, MSN RN  Ambulatory Care Manager  Phone: [phone]  Email: [email or web address]

## 2024-04-29 ENCOUNTER — CARE COORDINATION (OUTPATIENT)
Dept: OTHER | Facility: CLINIC | Age: 69
End: 2024-04-29

## 2024-04-29 NOTE — CARE COORDINATION
ambulates with cane due to balance issues.       Offered patient enrollment in the Remote Patient Monitoring (RPM) program for in-home monitoring: Patient is not eligible for RPM program because: clinical team licensure.     Assessments Completed:   No changes since last call    Care Planning:    Goals Addressed                   This Visit's Progress     Conditions and Symptoms   On track     I will schedule office visits, as directed by my provider.  I will keep my appointment or reschedule if I have to cancel.  I will notify my provider of any barriers to my plan of care.  I will notify my provider of any symptoms that indicate a worsening of my condition.    Barriers: none  Plan for overcoming my barriers: N/A  Confidence: 8/10  Anticipated Goal Completion Date: 5/16/24 & ongoing    4/16/24: Patient has follow up with endocrinology tomorrow. Received urology referral from PF PCP today. Will be scheduling with cardiology. ACM reached out to  re: Bentyl refill.   4/29/24: Patient has upcoming appts with GI, urology, outpatient therapy for lymphedema management, and home sleep study.                     Advance Care Planning:   Not reviewed during this call     Medications Reviewed:   Completed during a previous call     PCP/Specialist follow up:     Follow-up Information       Follow up With Specialties Details Why Contact Info    Outpatient therapy for lymphedema  Follow up on 4/30/2024      Ang Riggins II, MD Urology Follow up on 5/2/2024  [address] [place] [address] [phone]      Home sleep study  Follow up on 5/17/2024      GI  Follow up on 7/23/2024              Follow Up:   Plan for next ACM outreach in approximately 2 weeks to complete SDOH assessments and to discuss plan of care after urology OV ; patient  is agreeable to this plan.     Terry Huynh MSN RN  Ambulatory Care Manager  Phone: [phone]  Email: [email or web address]

## 2024-05-03 ENCOUNTER — TRANSCRIBE ORDERS (OUTPATIENT)
Facility: HOSPITAL | Age: 69
End: 2024-05-03

## 2024-05-03 DIAGNOSIS — N28.89 RENAL MASS: Primary | ICD-10-CM

## 2024-05-17 ENCOUNTER — CARE COORDINATION (OUTPATIENT)
Dept: OTHER | Facility: CLINIC | Age: 69
End: 2024-05-17

## 2024-05-17 RX ORDER — POLYETHYLENE GLYCOL 3350 17 G/17G
17 POWDER, FOR SOLUTION ORAL 2 TIMES DAILY
COMMUNITY

## 2024-05-17 SDOH — ECONOMIC STABILITY: HOUSING INSECURITY
IN THE LAST 12 MONTHS, WAS THERE A TIME WHEN YOU DID NOT HAVE A STEADY PLACE TO SLEEP OR SLEPT IN A SHELTER (INCLUDING NOW)?: NO

## 2024-05-17 SDOH — ECONOMIC STABILITY: FOOD INSECURITY: WITHIN THE PAST 12 MONTHS, YOU WORRIED THAT YOUR FOOD WOULD RUN OUT BEFORE YOU GOT MONEY TO BUY MORE.: NEVER TRUE

## 2024-05-17 SDOH — ECONOMIC STABILITY: INCOME INSECURITY: IN THE LAST 12 MONTHS, WAS THERE A TIME WHEN YOU WERE NOT ABLE TO PAY THE MORTGAGE OR RENT ON TIME?: NO

## 2024-05-17 SDOH — ECONOMIC STABILITY: FOOD INSECURITY: WITHIN THE PAST 12 MONTHS, THE FOOD YOU BOUGHT JUST DIDN'T LAST AND YOU DIDN'T HAVE MONEY TO GET MORE.: NEVER TRUE

## 2024-05-17 ASSESSMENT — SOCIAL DETERMINANTS OF HEALTH (SDOH): HOW HARD IS IT FOR YOU TO PAY FOR THE VERY BASICS LIKE FOOD, HOUSING, MEDICAL CARE, AND HEATING?: NOT HARD AT ALL

## 2024-05-17 NOTE — CARE COORDINATION
Ambulatory Care Coordination Note     2024 1715       Patient Current Location:  Home: [address]  [facility] [address]     ACM contacted the patient by telephone. Verified name and  with patient as identifiers.         ACM: Terry Huynh RN     Challenges to be reviewed by the provider   Additional needs identified to be addressed with provider No  none               Method of communication with provider: none.    Care Summary Note: ACM called patient for CM follow up. She reports still with mild abdominal pain. She continues to take Dicyclomine which is helping. Patient reports eating less to help with weight loss. She is still constipated. [facility] ordered Linzess last year but she did not get the medication due to cost. States when she was deciding on  insurance coverage she asked about cost of Linzess with her new plan and was told copay is $47. She did not factor in the deductible cost which is >$500. Patient still not able to get the medication due to cost. [facility] GI provider is aware that she is not able to get the medication. GI ordered different medication for constipation which was not covered by her insurance. [facility] GI now suggesting she take Miralax BID. Patient planning to try that. States she passes 3-4 peanut sized hard stool pieces every time she urinates. Patient reports drinking a lot of water.     SDOH assessment completed. Patient states lymphedema garments were costly but lymphedema therapist was able to have claim processed through [facility] which reduced out of pocket cost to patient. States she had to pay 20% out of pocket instead of 100%. Patient states was able to get financial assistance through  for lymphedema pump co pay cost. States with financial assistance she had $0 out of pocket. Patient reports had issue with large gas bill the end of last year and is now on promise pay program which divides the large bill into 25-30 monthly payments. Denies issues paying

## 2024-05-29 ENCOUNTER — CARE COORDINATION (OUTPATIENT)
Dept: OTHER | Facility: CLINIC | Age: 69
End: 2024-05-29

## 2024-05-29 NOTE — CARE COORDINATION
Ivon will get CPAP machine and supplies.     Barriers: none  Plan for overcoming my barriers: N/A  Confidence: 9/10  Anticipated Goal Completion Date: 6/29/24 5/29/24: Patient states provider ordered CPAP and supplies; reports received insurance approval. She is now waiting on arrival.                PCP/Specialist follow up:     Follow-up Information       Follow up With Specialties Details Why Contact Info    Sleep medicine  Follow up on 5/31/2024 virtual visit     GI  Follow up on 7/23/2024                Follow Up:   Plan for next ACM outreach in approximately 3 weeks to complete:  - advance care planning   - goal progression.   patient  is agreeable to this plan.     RAGHU Mart RN  Ambulatory Care Manager  Phone: [phone]  Email: [email or web address]

## 2024-06-21 ENCOUNTER — CARE COORDINATION (OUTPATIENT)
Dept: OTHER | Facility: CLINIC | Age: 69
End: 2024-06-21

## 2024-06-21 NOTE — CARE COORDINATION
Ambulatory Care Coordination Note     2024 10:33 AM     Patient Current Location:  Home: [address]  [facility] [address]     ACM contacted the patient by telephone. Verified name and  with patient as identifiers.         ACM: Terry Huynh RN     Challenges to be reviewed by the provider   Additional needs identified to be addressed with provider No  none               Method of communication with provider: none.    Care Summary Note: [facility] called patient for CM follow up. She has not yet received CPAP machine and supplies. States she did receive order confirmation from [facility]. Patient states had right hip injection for bursitis last week. Reports pain improved and now almost gone. Patient has OV with podiatry  for injection into left foot for plantar fascitis. Patient reports has plantar fascitis in both heels; has had injections in the past. States injection to right foot helped pain in the past; left foot still with pain. 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 States at home she does not wear anything on feet; wears surgical shoes when leaving the home. Patient recently purchased [employer] shoes. Unable to wear those due to overlap of skin on inside of bilateral ankles and the shoes dig into this area (despite being low cut shoes). Patient has been working with [employer] company to find shoes to fit her; even tried sandals but none are wide enough.     Patient working on transitioning from lymphedema bandages to lymphedema compression stockings. \[facility]"" lymphedema therapist currently has her compression stockings and will try them again with her soon. She sees lymphedema therapy 2x/week. \[facility]\"" Medicare recently paid for new lymphedema pump for her but not yet able to use it until she gets into compression stockings. Patient reports  currently incarcerated; she lives alone and drives self to appointments.     Patient reports

## 2024-07-18 ENCOUNTER — CARE COORDINATION (OUTPATIENT)
Dept: OTHER | Facility: CLINIC | Age: 69
End: 2024-07-18

## 2024-07-19 ENCOUNTER — CARE COORDINATION (OUTPATIENT)
Dept: OTHER | Facility: CLINIC | Age: 69
End: 2024-07-19

## 2024-07-19 SDOH — HEALTH STABILITY: PHYSICAL HEALTH: ON AVERAGE, HOW MANY MINUTES DO YOU ENGAGE IN EXERCISE AT THIS LEVEL?: 0 MIN

## 2024-07-19 SDOH — ECONOMIC STABILITY: TRANSPORTATION INSECURITY
IN THE PAST 12 MONTHS, HAS THE LACK OF TRANSPORTATION KEPT YOU FROM MEDICAL APPOINTMENTS OR FROM GETTING MEDICATIONS?: NO

## 2024-07-19 SDOH — HEALTH STABILITY: PHYSICAL HEALTH: ON AVERAGE, HOW MANY DAYS PER WEEK DO YOU ENGAGE IN MODERATE TO STRENUOUS EXERCISE (LIKE A BRISK WALK)?: 0 DAYS

## 2024-07-19 SDOH — ECONOMIC STABILITY: TRANSPORTATION INSECURITY
IN THE PAST 12 MONTHS, HAS LACK OF TRANSPORTATION KEPT YOU FROM MEETINGS, WORK, OR FROM GETTING THINGS NEEDED FOR DAILY LIVING?: NO

## 2024-07-19 ASSESSMENT — PATIENT HEALTH QUESTIONNAIRE - PHQ9
SUM OF ALL RESPONSES TO PHQ9 QUESTIONS 1 & 2: 2
10. IF YOU CHECKED OFF ANY PROBLEMS, HOW DIFFICULT HAVE THESE PROBLEMS MADE IT FOR YOU TO DO YOUR WORK, TAKE CARE OF THINGS AT HOME, OR GET ALONG WITH OTHER PEOPLE: NOT DIFFICULT AT ALL
2. FEELING DOWN, DEPRESSED OR HOPELESS: SEVERAL DAYS
1. LITTLE INTEREST OR PLEASURE IN DOING THINGS: SEVERAL DAYS

## 2024-07-19 NOTE — CARE COORDINATION
Ambulatory Care Coordination Note     2024 12:13 PM     Patient Current Location:  Home: [address]  [facility] [address]     ACM contacted the patient by telephone. Verified name and  with patient as identifiers.         ACM: Terry Huynh RN     Challenges to be reviewed by the provider   Additional needs identified to be addressed with provider No  none               Method of communication with provider: none.    Care Summary Note: ACM called patient for CM follow up. She reports left heel pain improved since injection earlier this month. States she has been going through a lot lately which is causing her a lot of stress. She reports being established with therapist and psychiatrist. Seeing therapist monthly; next visit scheduled for 24 (virtual visit). States she has been seeing this therapist for years and is a \"good fit\". Patient reports history of depression and anxiety. Reports taking mental health medications as prescribed. Patient  had issues with sleep in the past; now sleeping well with Clonazepam. She confirmed delivery of CPAP and is using. States using full face mask on CPAP and tolerating well. Patient  has used CPAP for entire night since she received the machine. She reports getting 7 hours of sleep at night. Of note, patient  had CPAP ~2 years ago and did not tolerate that one due to nasal congestion and felt like she could not breathe. Denies that issue at this time.     Patient states still using lymphedema wraps which she gets changed 2x/week by therapist at lymphedema clinic.  usually goes to clinic on  and . Patient  has not yet started using compression garments due to waiting delivery of bedtime compression garments. [facility] therapist has the daytime compression garments for patient but cannot start using until the bedtime garments are delivered. Patient noted that she tried to use compression hayes pants in the past but was

## 2024-08-14 ENCOUNTER — CARE COORDINATION (OUTPATIENT)
Dept: OTHER | Facility: CLINIC | Age: 69
End: 2024-08-14

## 2024-08-14 NOTE — CARE COORDINATION
medications the way you have been told to take them?: Sometimes I take them as prescribed.     Do you have Home O2 Therapy?: No      Ability to seek help/take action for Emergent Urgent situations i.e. fire, crime, inclement weather or health crisis.: Independent  Ability to ambulate to restroom: Independent  Ability handle personal hygeine needs (bathing/dressing/grooming): Independent  Ability to manage Medications: Independent  Ability to prepare Food Preparation: Independent  Ability to maintain home (clean home, laundry): Independent  Ability to drive and/or has transportation: Independent  Ability to do shopping: Independent  Ability to manage finances: Independent  Is patient able to live independently?: Yes     Current Housing: Private Residence  Who do you live with?: Alone  Are you an active caregiver in your home?: No     Do you have any DME?: Yes  Patient DME: Quad cane, Other  Other Patient DME: rollator     Per the Fall Risk Screening, did the patient have 2 or more falls or 1 fall with injury in the past year?: Yes  How often do you think you are about to fall and you do NOT fall? For example, you grab something to stabilize yourself or hold onto a wall/furniture?: Frequently  Use of a Mobility Aid: Yes  Difficulty walking/impaired gait: Yes  Issues with feet or shoes like numbness, edema, shoes not fitting: Yes  Changes in vision, poor vision or poor lighting in environment: Yes  Dizziness: No           Suggested Interventions and Community Resources  Fall Risk Prevention: Completed Other Services or Interventions: Enrolled in RPM through cardiology office   Medication Assistance Program: Completed (Comment: Patient using [employer]; reports able to afford medications with this)   Occupational Therapy: In Process (Comment: Patient current with outpatient OT for lymphedema at [facility])   Pharmacist: Not Started         Other Interventions: Set up/Review Goals           ,   Care

## 2025-02-11 ENCOUNTER — OFFICE VISIT (OUTPATIENT)
Age: 70
End: 2025-02-11

## 2025-02-11 VITALS
WEIGHT: 293 LBS | SYSTOLIC BLOOD PRESSURE: 147 MMHG | HEART RATE: 54 BPM | BODY MASS INDEX: 53.92 KG/M2 | RESPIRATION RATE: 16 BRPM | HEIGHT: 62 IN | DIASTOLIC BLOOD PRESSURE: 69 MMHG | OXYGEN SATURATION: 94 % | TEMPERATURE: 97.7 F

## 2025-02-11 DIAGNOSIS — K43.9 VENTRAL HERNIA WITHOUT OBSTRUCTION OR GANGRENE: Primary | ICD-10-CM

## 2025-02-11 RX ORDER — MONTELUKAST SODIUM 10 MG/1
10 TABLET ORAL NIGHTLY
COMMUNITY

## 2025-02-11 RX ORDER — BENAZEPRIL/HYDROCHLOROTHIAZIDE 20 MG-25MG
1 TABLET ORAL DAILY
COMMUNITY

## 2025-02-11 RX ORDER — ESTRADIOL 0.1 MG/G
1 CREAM VAGINAL SEE ADMIN INSTRUCTIONS
COMMUNITY
Start: 2024-11-14

## 2025-02-11 ASSESSMENT — PATIENT HEALTH QUESTIONNAIRE - PHQ9
2. FEELING DOWN, DEPRESSED OR HOPELESS: NOT AT ALL
SUM OF ALL RESPONSES TO PHQ QUESTIONS 1-9: 0
SUM OF ALL RESPONSES TO PHQ9 QUESTIONS 1 & 2: 0
SUM OF ALL RESPONSES TO PHQ QUESTIONS 1-9: 0
1. LITTLE INTEREST OR PLEASURE IN DOING THINGS: NOT AT ALL

## 2025-02-11 NOTE — PROGRESS NOTES
Subjective:       Sheila M Harris Reyes is a 70 y.o. female who presents for evaluation of right sided hernia. She reports it started about 2-3 months. She went to her PCP about 1 week ago and was referred here. It stays out. She reports some discomfort. No history of abdominal surgery. She reports constipation problems. Tolerating a diet. She has A. Fib, HTN - cardiologist is Dr. Salmon. Lymphadenema in her legs.      Past Medical History:   Diagnosis Date    Arthritis     Asthma     Hypertension     Other ill-defined conditions(799.89)     edema legs    Other ill-defined conditions(799.89)     irregular heart beat    Other ill-defined conditions(799.89)     fibromyalgia    Psychiatric disorder     anxiety    Psychiatric disorder     depression    PUD (peptic ulcer disease)      Past Surgical History:   Procedure Laterality Date    BREAST SURGERY      sebaceous cyst removal    COLONOSCOPY N/A 8/9/2021    COLONOSCOPY performed by Claudy Chacko MD at [facility] ENDOSCOPY    GYN      d&c    HEENT      tonsillectomy    HEENT      keloids removed from ears    ORTHOPEDIC SURGERY      right ankle     Social History     Socioeconomic History    Marital status:    Tobacco Use    Smoking status: Never    Smokeless tobacco: Never   Substance and Sexual Activity    Alcohol use: No     Social Determinants of Health     Financial Resource Strain: Low Risk  (5/17/2024)    Overall Financial Resource Strain (CARDIA)     Difficulty of Paying Living Expenses: Not hard at all   Food Insecurity: No Food Insecurity (5/17/2024)    Hunger Vital Sign     Worried About Running Out of Food in the Last Year: Never true     Ran Out of Food in the Last Year: Never true   Transportation Needs: No Transportation Needs (7/19/2024)    PRAPARE - Transportation     Lack of Transportation (Medical): No     Lack of Transportation (Non-Medical): No   Physical Activity: Inactive (7/19/2024)    Exercise Vital Sign     Days of Exercise per Week: 0

## 2025-02-11 NOTE — PROGRESS NOTES
Identified pt with two pt identifiers (name and ). Reviewed chart in preparation for visit and have obtained necessary documentation.    Sheila M Harris Reyes is a 70 y.o. female Hernia (Seen at the request of self for evaluation of possible hernia.)  .    Vitals:    25 0956 25 1000   BP: (!) 146/72 (!) 147/69   Site: Left Upper Arm Right Upper Arm   Position: Sitting Sitting   Cuff Size: Large Adult Large Adult   Pulse: (!) 48 54   Resp: 16    Temp: 97.7 °F (36.5 °C)    TempSrc: Oral    SpO2: 94%    Weight: (!) 147.7 kg (325 lb 9.6 oz)    Height: 1.575 m (5' 2\")           1. Have you been to the ER, urgent care clinic since your last visit?  Hospitalized since your last visit?  no     2. Have you seen or consulted any other health care providers outside of the [facility] since your last visit?  Include any pap smears or colon screening.  No  Patient and provider made aware of elevated BP x2. Patient asymptomatic. Patient reminded to monitor BP, continue to take BP medications if prescribed, and follow up with PCP/Cardiologist.  Patient expressed understanding and agreement.   Patient she does see a cardiologist.

## 2025-02-12 ENCOUNTER — TELEPHONE (OUTPATIENT)
Age: 70
End: 2025-02-12

## 2025-02-12 NOTE — TELEPHONE ENCOUNTER
Patient called in reference to referral for Dr. Lutz     Verified name, , and email. Sent the new patient seminar as well as instructions

## 2025-02-26 ENCOUNTER — HOSPITAL ENCOUNTER (OUTPATIENT)
Age: 70
Discharge: HOME | End: 2025-03-01
Payer: MEDICARE

## 2025-02-26 DIAGNOSIS — K43.9 VENTRAL HERNIA WITHOUT OBSTRUCTION OR GANGRENE: ICD-10-CM

## 2025-02-26 PROCEDURE — 74176 CT ABD & PELVIS W/O CONTRAST: CPT

## 2025-02-28 ENCOUNTER — TELEPHONE (OUTPATIENT)
Age: 70
End: 2025-02-28

## 2025-02-28 NOTE — TELEPHONE ENCOUNTER
Called pt - informed of result and recommendation. Proceed with procedure as scheduled today. Pt verbalized understanding.     Patient called to see if we received results for ct     [phone]

## 2025-03-04 ENCOUNTER — TRANSCRIBE ORDERS (OUTPATIENT)
Facility: HOSPITAL | Age: 70
End: 2025-03-04

## 2025-03-04 ENCOUNTER — TELEPHONE (OUTPATIENT)
Age: 70
End: 2025-03-04

## 2025-03-04 DIAGNOSIS — M54.16 RIGHT LUMBAR RADICULITIS: ICD-10-CM

## 2025-03-04 DIAGNOSIS — M43.16 SPONDYLOLISTHESIS OF LUMBAR REGION: ICD-10-CM

## 2025-03-04 DIAGNOSIS — M47.816 LUMBAR SPONDYLOSIS: Primary | ICD-10-CM

## 2025-03-04 NOTE — TELEPHONE ENCOUNTER
CT results discussed with the patient. It shows constipation.     No evidence of hernias. No acute surgical intervention.    Andreea Stanton MD

## 2025-03-07 ENCOUNTER — TRANSCRIBE ORDERS (OUTPATIENT)
Facility: HOSPITAL | Age: 70
End: 2025-03-07

## 2025-03-07 DIAGNOSIS — G95.9 CERVICAL MYELOPATHY (HCC): Primary | ICD-10-CM

## 2025-03-07 DIAGNOSIS — M43.12 SPONDYLOLISTHESIS OF CERVICAL REGION: ICD-10-CM

## 2025-03-07 DIAGNOSIS — M47.812 CERVICAL SPONDYLOSIS: ICD-10-CM

## 2025-03-11 ENCOUNTER — CLINICAL DOCUMENTATION (OUTPATIENT)
Age: 70
End: 2025-03-11

## 2025-03-25 ENCOUNTER — HOSPITAL ENCOUNTER (OUTPATIENT)
Age: 70
Discharge: HOME | End: 2025-03-28
Payer: MEDICARE

## 2025-03-25 DIAGNOSIS — M47.812 CERVICAL SPONDYLOSIS: ICD-10-CM

## 2025-03-25 DIAGNOSIS — M43.12 SPONDYLOLISTHESIS OF CERVICAL REGION: ICD-10-CM

## 2025-03-25 DIAGNOSIS — G95.9 CERVICAL MYELOPATHY (HCC): ICD-10-CM

## 2025-03-25 DIAGNOSIS — M54.16 RIGHT LUMBAR RADICULITIS: ICD-10-CM

## 2025-03-25 DIAGNOSIS — M47.816 LUMBAR SPONDYLOSIS: ICD-10-CM

## 2025-03-25 DIAGNOSIS — M43.16 SPONDYLOLISTHESIS OF LUMBAR REGION: ICD-10-CM

## 2025-03-25 PROCEDURE — 72141 MRI NECK SPINE W/O DYE: CPT

## 2025-03-25 PROCEDURE — 72148 MRI LUMBAR SPINE W/O DYE: CPT

## 2025-06-05 ENCOUNTER — TELEPHONE (OUTPATIENT)
Age: 70
End: 2025-06-05

## 2025-08-19 ENCOUNTER — TRANSCRIBE ORDERS (OUTPATIENT)
Facility: HOSPITAL | Age: 70
End: 2025-08-19

## 2025-08-19 DIAGNOSIS — R31.29 MICROSCOPIC HEMATURIA: ICD-10-CM

## 2025-08-19 DIAGNOSIS — R80.9 PROTEINURIA, UNSPECIFIED TYPE: Primary | ICD-10-CM

## (undated) DEVICE — GLIDESHEATH SLENDER ACCESS KIT: Brand: GLIDESHEATH SLENDER

## (undated) DEVICE — HI-TORQUE VERSACORE FLOPPY GUIDE WIRE SYSTEM 145 CM: Brand: HI-TORQUE VERSACORE

## (undated) DEVICE — GUIDEWIRE VASC L260CM 0.035IN J TIP L3MM PTFE FIX COR NAMIC

## (undated) DEVICE — 3M™ TEGADERM™ TRANSPARENT FILM DRESSING FRAME STYLE, 1626W, 4 IN X 4-3/4 IN (10 CM X 12 CM), 50/CT 4CT/CASE: Brand: 3M™ TEGADERM™

## (undated) DEVICE — HEART CATH-MRMC: Brand: MEDLINE INDUSTRIES, INC.

## (undated) DEVICE — RADIFOCUS OPTITORQUE ANGIOGRAPHIC CATHETER: Brand: OPTITORQUE

## (undated) DEVICE — TUBING PRSS MON L6IN PVC M FEM CONN

## (undated) DEVICE — SPLINT WR POS F/ARTERIAL ACC -- BX/10

## (undated) DEVICE — TUBING HYDR IRR --

## (undated) DEVICE — BAND COMPR L21CM SHT CLR PLAS HEMSTAT EXT HK AND LOOP RETEN

## (undated) DEVICE — SYRINGE ANGIO 10 CC BRL STD PRNT POLYCARB LT BLU MEDALLION